# Patient Record
Sex: MALE | Race: WHITE | NOT HISPANIC OR LATINO | Employment: OTHER | URBAN - METROPOLITAN AREA
[De-identification: names, ages, dates, MRNs, and addresses within clinical notes are randomized per-mention and may not be internally consistent; named-entity substitution may affect disease eponyms.]

---

## 2017-02-16 ENCOUNTER — OFFICE VISIT - HEALTHEAST (OUTPATIENT)
Dept: INTERNAL MEDICINE | Facility: CLINIC | Age: 67
End: 2017-02-16

## 2017-02-16 DIAGNOSIS — R22.41 MASS OF RIGHT THIGH: ICD-10-CM

## 2017-02-16 DIAGNOSIS — R11.0 NAUSEA: ICD-10-CM

## 2017-02-27 ENCOUNTER — OFFICE VISIT - HEALTHEAST (OUTPATIENT)
Dept: SURGERY | Facility: CLINIC | Age: 67
End: 2017-02-27

## 2017-02-27 DIAGNOSIS — R19.00 PELVIC MASS IN MALE: ICD-10-CM

## 2017-02-27 ASSESSMENT — MIFFLIN-ST. JEOR: SCORE: 1836.73

## 2017-03-10 ENCOUNTER — HOSPITAL ENCOUNTER (OUTPATIENT)
Dept: MRI IMAGING | Facility: CLINIC | Age: 67
Discharge: HOME | End: 2017-03-10
Attending: SURGERY

## 2017-03-10 DIAGNOSIS — R19.00 PELVIC MASS IN MALE: ICD-10-CM

## 2017-03-20 ENCOUNTER — COMMUNICATION - HEALTHEAST (OUTPATIENT)
Dept: SURGERY | Facility: CLINIC | Age: 67
End: 2017-03-20

## 2017-03-29 ENCOUNTER — COMMUNICATION - HEALTHEAST (OUTPATIENT)
Dept: SURGERY | Facility: CLINIC | Age: 67
End: 2017-03-29

## 2017-04-04 ENCOUNTER — RECORDS - HEALTHEAST (OUTPATIENT)
Dept: GENERAL RADIOLOGY | Facility: CLINIC | Age: 67
End: 2017-04-04

## 2017-04-04 ENCOUNTER — COMMUNICATION - HEALTHEAST (OUTPATIENT)
Dept: INTERNAL MEDICINE | Facility: CLINIC | Age: 67
End: 2017-04-04

## 2017-04-04 ENCOUNTER — OFFICE VISIT - HEALTHEAST (OUTPATIENT)
Dept: INTERNAL MEDICINE | Facility: CLINIC | Age: 67
End: 2017-04-04

## 2017-04-04 DIAGNOSIS — M12.9 ARTHROPATHY, UNSPECIFIED: ICD-10-CM

## 2017-04-04 DIAGNOSIS — M19.072 ARTHROPATHY OF LEFT ANKLE: ICD-10-CM

## 2017-04-04 DIAGNOSIS — E53.8 OTHER B-COMPLEX DEFICIENCIES: ICD-10-CM

## 2017-04-04 DIAGNOSIS — Z12.5 SPECIAL SCREENING FOR MALIGNANT NEOPLASM OF PROSTATE: ICD-10-CM

## 2017-04-04 DIAGNOSIS — J45.20 MILD INTERMITTENT ASTHMA: ICD-10-CM

## 2017-04-04 DIAGNOSIS — D12.6 BENIGN NEOPLASM OF COLON: ICD-10-CM

## 2017-04-04 DIAGNOSIS — Z13.220 SCREENING CHOLESTEROL LEVEL: ICD-10-CM

## 2017-04-04 DIAGNOSIS — Z13.228 SCREENING FOR METABOLIC DISORDER: ICD-10-CM

## 2017-04-04 DIAGNOSIS — Z00.00 ROUTINE GENERAL MEDICAL EXAMINATION AT A HEALTH CARE FACILITY: ICD-10-CM

## 2017-04-04 LAB
CHOLEST SERPL-MCNC: 197 MG/DL
FASTING STATUS PATIENT QL REPORTED: NORMAL
HDLC SERPL-MCNC: 46 MG/DL
LDLC SERPL CALC-MCNC: 129 MG/DL
PSA SERPL-MCNC: 0.7 NG/ML (ref 0–4.5)
TRIGL SERPL-MCNC: 108 MG/DL

## 2017-04-04 ASSESSMENT — MIFFLIN-ST. JEOR: SCORE: 1830.61

## 2017-04-06 ENCOUNTER — COMMUNICATION - HEALTHEAST (OUTPATIENT)
Dept: INTERNAL MEDICINE | Facility: CLINIC | Age: 67
End: 2017-04-06

## 2017-04-11 ENCOUNTER — RECORDS - HEALTHEAST (OUTPATIENT)
Dept: ADMINISTRATIVE | Facility: OTHER | Age: 67
End: 2017-04-11

## 2017-07-14 ENCOUNTER — COMMUNICATION - HEALTHEAST (OUTPATIENT)
Dept: INTERNAL MEDICINE | Facility: CLINIC | Age: 67
End: 2017-07-14

## 2017-08-10 ENCOUNTER — RECORDS - HEALTHEAST (OUTPATIENT)
Dept: ADMINISTRATIVE | Facility: OTHER | Age: 67
End: 2017-08-10

## 2018-05-10 ENCOUNTER — COMMUNICATION - HEALTHEAST (OUTPATIENT)
Dept: INTERNAL MEDICINE | Facility: CLINIC | Age: 68
End: 2018-05-10

## 2018-05-14 ENCOUNTER — OFFICE VISIT - HEALTHEAST (OUTPATIENT)
Dept: INTERNAL MEDICINE | Facility: CLINIC | Age: 68
End: 2018-05-14

## 2018-05-14 DIAGNOSIS — F41.9 ANXIETY: ICD-10-CM

## 2018-05-14 DIAGNOSIS — S06.9XAA TBI (TRAUMATIC BRAIN INJURY) (H): ICD-10-CM

## 2018-05-22 ENCOUNTER — COMMUNICATION - HEALTHEAST (OUTPATIENT)
Dept: INTERNAL MEDICINE | Facility: CLINIC | Age: 68
End: 2018-05-22

## 2018-05-22 DIAGNOSIS — J45.20 MILD INTERMITTENT ASTHMA: ICD-10-CM

## 2018-11-07 ENCOUNTER — COMMUNICATION - HEALTHEAST (OUTPATIENT)
Dept: INTERNAL MEDICINE | Facility: CLINIC | Age: 68
End: 2018-11-07

## 2018-11-23 ENCOUNTER — OFFICE VISIT - HEALTHEAST (OUTPATIENT)
Dept: FAMILY MEDICINE | Facility: CLINIC | Age: 68
End: 2018-11-23

## 2018-11-23 DIAGNOSIS — N62 GYNECOMASTIA: ICD-10-CM

## 2018-11-23 DIAGNOSIS — L57.0 ACTINIC KERATOSIS: ICD-10-CM

## 2018-11-23 DIAGNOSIS — Z12.5 SCREENING FOR PROSTATE CANCER: ICD-10-CM

## 2018-11-23 DIAGNOSIS — Z00.00 WELL ADULT EXAM: ICD-10-CM

## 2018-11-23 DIAGNOSIS — D17.30 LIPOMA OF SKIN AND SUBCUTANEOUS TISSUE: ICD-10-CM

## 2018-11-23 DIAGNOSIS — F41.9 ANXIETY: ICD-10-CM

## 2018-11-23 DIAGNOSIS — H35.9 RETINA DISORDER: ICD-10-CM

## 2018-11-23 DIAGNOSIS — D48.5 NEOPLASM OF UNCERTAIN BEHAVIOR OF SKIN: ICD-10-CM

## 2018-11-23 DIAGNOSIS — E53.8 VITAMIN B12 DEFICIENCY (NON ANEMIC): ICD-10-CM

## 2018-11-23 DIAGNOSIS — S06.9X3A TRAUMATIC BRAIN INJURY, WITH LOSS OF CONSCIOUSNESS OF 1 HOUR TO 5 HOURS 59 MINUTES, INITIAL ENCOUNTER (H): ICD-10-CM

## 2018-11-27 ENCOUNTER — AMBULATORY - HEALTHEAST (OUTPATIENT)
Dept: LAB | Facility: CLINIC | Age: 68
End: 2018-11-27

## 2018-11-27 DIAGNOSIS — E53.8 VITAMIN B12 DEFICIENCY (NON ANEMIC): ICD-10-CM

## 2018-11-27 DIAGNOSIS — Z12.5 SCREENING FOR PROSTATE CANCER: ICD-10-CM

## 2018-11-27 DIAGNOSIS — F41.9 ANXIETY: ICD-10-CM

## 2018-11-27 DIAGNOSIS — N62 GYNECOMASTIA: ICD-10-CM

## 2018-11-27 DIAGNOSIS — Z00.00 WELL ADULT EXAM: ICD-10-CM

## 2018-11-27 LAB
ALBUMIN SERPL-MCNC: 3.9 G/DL (ref 3.5–5)
ALP SERPL-CCNC: 45 U/L (ref 45–120)
ALT SERPL W P-5'-P-CCNC: 22 U/L (ref 0–45)
ANION GAP SERPL CALCULATED.3IONS-SCNC: 11 MMOL/L (ref 5–18)
AST SERPL W P-5'-P-CCNC: 19 U/L (ref 0–40)
BASOPHILS # BLD AUTO: 0 THOU/UL (ref 0–0.2)
BASOPHILS NFR BLD AUTO: 1 % (ref 0–2)
BILIRUB SERPL-MCNC: 0.5 MG/DL (ref 0–1)
BUN SERPL-MCNC: 20 MG/DL (ref 8–22)
C REACTIVE PROTEIN LHE: 0.4 MG/DL (ref 0–0.8)
CALCIUM SERPL-MCNC: 9.3 MG/DL (ref 8.5–10.5)
CHLORIDE BLD-SCNC: 106 MMOL/L (ref 98–107)
CO2 SERPL-SCNC: 25 MMOL/L (ref 22–31)
CREAT SERPL-MCNC: 1.01 MG/DL (ref 0.7–1.3)
EOSINOPHIL # BLD AUTO: 0.2 THOU/UL (ref 0–0.4)
EOSINOPHIL NFR BLD AUTO: 4 % (ref 0–6)
ERYTHROCYTE [DISTWIDTH] IN BLOOD BY AUTOMATED COUNT: 12.7 % (ref 11–14.5)
ERYTHROCYTE [SEDIMENTATION RATE] IN BLOOD BY WESTERGREN METHOD: 2 MM/HR (ref 0–15)
GFR SERPL CREATININE-BSD FRML MDRD: >60 ML/MIN/1.73M2
GLUCOSE BLD-MCNC: 104 MG/DL (ref 70–125)
HCT VFR BLD AUTO: 48.1 % (ref 40–54)
HGB BLD-MCNC: 16 G/DL (ref 14–18)
LYMPHOCYTES # BLD AUTO: 1.2 THOU/UL (ref 0.8–4.4)
LYMPHOCYTES NFR BLD AUTO: 25 % (ref 20–40)
MCH RBC QN AUTO: 30.3 PG (ref 27–34)
MCHC RBC AUTO-ENTMCNC: 33.3 G/DL (ref 32–36)
MCV RBC AUTO: 91 FL (ref 80–100)
MONOCYTES # BLD AUTO: 0.3 THOU/UL (ref 0–0.9)
MONOCYTES NFR BLD AUTO: 6 % (ref 2–10)
NEUTROPHILS # BLD AUTO: 3.1 THOU/UL (ref 2–7.7)
NEUTROPHILS NFR BLD AUTO: 65 % (ref 50–70)
PLATELET # BLD AUTO: 202 THOU/UL (ref 140–440)
PMV BLD AUTO: 9 FL (ref 7–10)
POTASSIUM BLD-SCNC: 4.1 MMOL/L (ref 3.5–5)
PROLACTIN SERPL-MCNC: 6.4 NG/ML (ref 0–15)
PROT SERPL-MCNC: 6.6 G/DL (ref 6–8)
PSA SERPL-MCNC: 0.8 NG/ML (ref 0–4.5)
RBC # BLD AUTO: 5.29 MILL/UL (ref 4.4–6.2)
SODIUM SERPL-SCNC: 142 MMOL/L (ref 136–145)
THYROID PEROXIDASE ANTIBODIES - HISTORICAL: <3 IU/ML (ref 0–5.6)
TSH SERPL DL<=0.005 MIU/L-ACNC: 1.7 UIU/ML (ref 0.3–5)
VIT B12 SERPL-MCNC: 568 PG/ML (ref 213–816)
WBC: 4.8 THOU/UL (ref 4–11)

## 2018-11-28 LAB
25(OH)D3 SERPL-MCNC: 30.7 NG/ML (ref 30–80)
DHEA-S SERPL-MCNC: 130 UG/DL (ref 42–290)

## 2018-11-30 LAB
CHOLEST SERPL-MCNC: 213 MG/DL
HDL SERPL QN: 8.7 NM
HDL SERPL-SCNC: 37.9 UMOL/L
HDLC SERPL-MCNC: 59 MG/DL (ref 40–59)
HLD.LARGE SERPL-SCNC: 4.7 UMOL/L
LDL SERPL QN: 21 NM
LDL SERPL-SCNC: 1797 NMOL/L
LDL SMALL SERPL-SCNC: 696 NMOL/L
LDLC SERPL CALC-MCNC: 138 MG/DL
PATHOLOGY STUDY: ABNORMAL
SHBG SERPL-SCNC: 48 NMOL/L (ref 11–80)
TESTOST FREE SERPL-MCNC: 6.64 NG/DL (ref 4.7–24.4)
TESTOST SERPL-MCNC: 415 NG/DL (ref 240–950)
TRIGL SERPL-MCNC: 81 MG/DL (ref 30–149)
VLDL LARGE SERPL-SCNC: 2.2 NMOL/L
VLDL SERPL QN: 46 NM

## 2018-12-05 ENCOUNTER — COMMUNICATION - HEALTHEAST (OUTPATIENT)
Dept: FAMILY MEDICINE | Facility: CLINIC | Age: 68
End: 2018-12-05

## 2018-12-11 ENCOUNTER — COMMUNICATION - HEALTHEAST (OUTPATIENT)
Dept: FAMILY MEDICINE | Facility: CLINIC | Age: 68
End: 2018-12-11

## 2018-12-12 ENCOUNTER — COMMUNICATION - HEALTHEAST (OUTPATIENT)
Dept: FAMILY MEDICINE | Facility: CLINIC | Age: 68
End: 2018-12-12

## 2018-12-26 ENCOUNTER — RECORDS - HEALTHEAST (OUTPATIENT)
Dept: ADMINISTRATIVE | Facility: OTHER | Age: 68
End: 2018-12-26

## 2019-02-04 ENCOUNTER — COMMUNICATION - HEALTHEAST (OUTPATIENT)
Dept: SCHEDULING | Facility: CLINIC | Age: 69
End: 2019-02-04

## 2019-02-04 ENCOUNTER — OFFICE VISIT - HEALTHEAST (OUTPATIENT)
Dept: FAMILY MEDICINE | Facility: CLINIC | Age: 69
End: 2019-02-04

## 2019-02-04 DIAGNOSIS — J06.9 VIRAL URI WITH COUGH: ICD-10-CM

## 2019-02-06 ENCOUNTER — OFFICE VISIT - HEALTHEAST (OUTPATIENT)
Dept: SURGERY | Facility: CLINIC | Age: 69
End: 2019-02-06

## 2019-02-06 ENCOUNTER — COMMUNICATION - HEALTHEAST (OUTPATIENT)
Dept: SCHEDULING | Facility: CLINIC | Age: 69
End: 2019-02-06

## 2019-02-06 DIAGNOSIS — D17.30 LIPOMA OF SKIN AND SUBCUTANEOUS TISSUE: ICD-10-CM

## 2019-02-06 ASSESSMENT — MIFFLIN-ST. JEOR: SCORE: 1814.51

## 2019-02-28 ASSESSMENT — MIFFLIN-ST. JEOR: SCORE: 1814.05

## 2019-03-01 ENCOUNTER — OFFICE VISIT - HEALTHEAST (OUTPATIENT)
Dept: FAMILY MEDICINE | Facility: CLINIC | Age: 69
End: 2019-03-01

## 2019-03-01 DIAGNOSIS — J33.9 NASAL POLYPS: ICD-10-CM

## 2019-03-01 DIAGNOSIS — J45.20 MILD INTERMITTENT ASTHMA, UNSPECIFIED WHETHER COMPLICATED: ICD-10-CM

## 2019-03-01 DIAGNOSIS — D17.23 LIPOMA OF RIGHT LOWER EXTREMITY: ICD-10-CM

## 2019-03-01 LAB
ALBUMIN SERPL-MCNC: 3.9 G/DL (ref 3.5–5)
ALP SERPL-CCNC: 55 U/L (ref 45–120)
ALT SERPL W P-5'-P-CCNC: 26 U/L (ref 0–45)
ANION GAP SERPL CALCULATED.3IONS-SCNC: 10 MMOL/L (ref 5–18)
AST SERPL W P-5'-P-CCNC: 16 U/L (ref 0–40)
BILIRUB SERPL-MCNC: 0.4 MG/DL (ref 0–1)
BUN SERPL-MCNC: 19 MG/DL (ref 8–22)
CALCIUM SERPL-MCNC: 9.5 MG/DL (ref 8.5–10.5)
CHLORIDE BLD-SCNC: 106 MMOL/L (ref 98–107)
CO2 SERPL-SCNC: 26 MMOL/L (ref 22–31)
CREAT SERPL-MCNC: 0.87 MG/DL (ref 0.7–1.3)
GFR SERPL CREATININE-BSD FRML MDRD: >60 ML/MIN/1.73M2
GLUCOSE BLD-MCNC: 86 MG/DL (ref 70–125)
POTASSIUM BLD-SCNC: 4.5 MMOL/L (ref 3.5–5)
PROT SERPL-MCNC: 6.8 G/DL (ref 6–8)
SODIUM SERPL-SCNC: 142 MMOL/L (ref 136–145)

## 2019-03-06 ENCOUNTER — ANESTHESIA - HEALTHEAST (OUTPATIENT)
Dept: SURGERY | Facility: AMBULATORY SURGERY CENTER | Age: 69
End: 2019-03-06

## 2019-03-07 ENCOUNTER — SURGERY - HEALTHEAST (OUTPATIENT)
Dept: SURGERY | Facility: AMBULATORY SURGERY CENTER | Age: 69
End: 2019-03-07

## 2019-03-07 ASSESSMENT — MIFFLIN-ST. JEOR: SCORE: 1814.05

## 2019-03-13 LAB
ATRIAL RATE - MUSE: 66 BPM
DIASTOLIC BLOOD PRESSURE - MUSE: NORMAL MMHG
INTERPRETATION ECG - MUSE: NORMAL
P AXIS - MUSE: 77 DEGREES
PR INTERVAL - MUSE: 186 MS
QRS DURATION - MUSE: 88 MS
QT - MUSE: 398 MS
QTC - MUSE: 417 MS
R AXIS - MUSE: 30 DEGREES
SYSTOLIC BLOOD PRESSURE - MUSE: NORMAL MMHG
T AXIS - MUSE: 66 DEGREES
VENTRICULAR RATE- MUSE: 66 BPM

## 2019-03-14 ENCOUNTER — OFFICE VISIT - HEALTHEAST (OUTPATIENT)
Dept: SURGERY | Facility: CLINIC | Age: 69
End: 2019-03-14

## 2019-03-14 DIAGNOSIS — Z48.89 POSTOPERATIVE VISIT: ICD-10-CM

## 2019-03-19 ENCOUNTER — COMMUNICATION - HEALTHEAST (OUTPATIENT)
Dept: SURGERY | Facility: CLINIC | Age: 69
End: 2019-03-19

## 2019-06-20 ENCOUNTER — COMMUNICATION - HEALTHEAST (OUTPATIENT)
Dept: FAMILY MEDICINE | Facility: CLINIC | Age: 69
End: 2019-06-20

## 2019-06-20 DIAGNOSIS — J45.20 MILD INTERMITTENT ASTHMA: ICD-10-CM

## 2020-01-16 ENCOUNTER — OFFICE VISIT - HEALTHEAST (OUTPATIENT)
Dept: FAMILY MEDICINE | Facility: CLINIC | Age: 70
End: 2020-01-16

## 2020-01-16 DIAGNOSIS — Z71.84 TRAVEL ADVICE ENCOUNTER: ICD-10-CM

## 2020-01-16 DIAGNOSIS — J45.30 MILD PERSISTENT ASTHMA WITHOUT COMPLICATION: ICD-10-CM

## 2020-05-05 ENCOUNTER — COMMUNICATION - HEALTHEAST (OUTPATIENT)
Dept: FAMILY MEDICINE | Facility: CLINIC | Age: 70
End: 2020-05-05

## 2020-05-05 DIAGNOSIS — J45.30 MILD PERSISTENT ASTHMA WITHOUT COMPLICATION: ICD-10-CM

## 2020-05-06 RX ORDER — DEXAMETHASONE 4 MG/1
TABLET ORAL
Qty: 12 INHALER | Refills: 2 | Status: SHIPPED | OUTPATIENT
Start: 2020-05-06

## 2020-10-15 ENCOUNTER — RECORDS - HEALTHEAST (OUTPATIENT)
Dept: ADMINISTRATIVE | Facility: OTHER | Age: 70
End: 2020-10-15

## 2021-03-26 ENCOUNTER — OFFICE VISIT - HEALTHEAST (OUTPATIENT)
Dept: FAMILY MEDICINE | Facility: CLINIC | Age: 71
End: 2021-03-26

## 2021-03-26 DIAGNOSIS — Z20.822 EXPOSURE TO COVID-19 VIRUS: ICD-10-CM

## 2021-03-26 DIAGNOSIS — Z11.59 ENCOUNTER FOR HEPATITIS C SCREENING TEST FOR LOW RISK PATIENT: ICD-10-CM

## 2021-03-26 DIAGNOSIS — D48.5 NEOPLASM OF UNCERTAIN BEHAVIOR OF SKIN: ICD-10-CM

## 2021-03-26 DIAGNOSIS — H61.23 CERUMINOSIS, BILATERAL: ICD-10-CM

## 2021-05-18 ENCOUNTER — RECORDS - HEALTHEAST (OUTPATIENT)
Dept: ADMINISTRATIVE | Facility: OTHER | Age: 71
End: 2021-05-18

## 2021-05-22 ENCOUNTER — HEALTH MAINTENANCE LETTER (OUTPATIENT)
Age: 71
End: 2021-05-22

## 2021-05-28 ASSESSMENT — ASTHMA QUESTIONNAIRES: ACT_TOTALSCORE: 25

## 2021-05-29 ENCOUNTER — RECORDS - HEALTHEAST (OUTPATIENT)
Dept: ADMINISTRATIVE | Facility: CLINIC | Age: 71
End: 2021-05-29

## 2021-05-29 NOTE — TELEPHONE ENCOUNTER
Refill Request  Did you contact pharmacy: No  Medication name:   Requested Prescriptions     Pending Prescriptions Disp Refills     albuterol (PROAIR HFA) 90 mcg/actuation inhaler 8.5 Inhaler 5     Who prescribed the medication: BONITA WEBSTER  Pharmacy Name and Location: [facility] in [place] [address]  Is patient out of medication: Yes  Patient notified refills processed in 72 hours:  yes  Okay to leave a detailed message: yes    Please expedite as patient is going out of town.  Please have covering Provider view and approve.

## 2021-05-30 VITALS — BODY MASS INDEX: 24.97 KG/M2 | HEIGHT: 77 IN | WEIGHT: 211.5 LBS

## 2021-05-30 VITALS — HEIGHT: 76 IN | BODY MASS INDEX: 26.18 KG/M2 | WEIGHT: 215 LBS

## 2021-05-30 VITALS — WEIGHT: 216.8 LBS

## 2021-05-31 ENCOUNTER — RECORDS - HEALTHEAST (OUTPATIENT)
Dept: ADMINISTRATIVE | Facility: CLINIC | Age: 71
End: 2021-05-31

## 2021-06-02 VITALS — BODY MASS INDEX: 25.57 KG/M2 | HEIGHT: 76 IN | WEIGHT: 210 LBS

## 2021-06-02 VITALS — BODY MASS INDEX: 25.2 KG/M2 | WEIGHT: 207 LBS

## 2021-06-02 VITALS — WEIGHT: 210.1 LBS | HEIGHT: 76 IN | BODY MASS INDEX: 25.58 KG/M2

## 2021-06-02 VITALS — BODY MASS INDEX: 25.42 KG/M2 | WEIGHT: 208.8 LBS

## 2021-06-02 VITALS — BODY MASS INDEX: 25.56 KG/M2 | WEIGHT: 210 LBS

## 2021-06-04 VITALS
DIASTOLIC BLOOD PRESSURE: 70 MMHG | OXYGEN SATURATION: 99 % | SYSTOLIC BLOOD PRESSURE: 100 MMHG | HEART RATE: 58 BPM | WEIGHT: 201 LBS | BODY MASS INDEX: 24.47 KG/M2

## 2021-06-05 VITALS
SYSTOLIC BLOOD PRESSURE: 100 MMHG | TEMPERATURE: 97.7 F | OXYGEN SATURATION: 99 % | HEART RATE: 73 BPM | DIASTOLIC BLOOD PRESSURE: 56 MMHG | BODY MASS INDEX: 22.21 KG/M2 | WEIGHT: 182.44 LBS

## 2021-06-05 NOTE — PROGRESS NOTES
OFFICE VISIT - FAMILY MEDICINE     ASSESSMENT AND PLAN     1. Travel advice encounter     2. Mild persistent asthma without complication  albuterol (PROAIR HFA) 90 mcg/actuation inhaler    fluticasone propionate (FLOVENT HFA) 110 mcg/actuation inhaler   We did review CDC recommendation for traveling to [facility], patient's immunization was reviewed, he will get the typhoid, hepatitis A #2, and flu vaccine.  He will check insurance coverage on shingrix and possibly get that at his local  pharmacy.  Asthma, uncontrolled, using albuterol daily we discussed adding a controller he was in agreement, Flovent was sent to use as directed, return if not improving.  Use albuterol as a rescue, asthma action plan given to patient.    CHIEF COMPLAINT   Travel Consult (Leaving for [facility] on 1/20/20 for 1 week, needs vaccination for trip.)    HPI   Shahriaredwar Watters is a 69 y.o. male.  No Patient Care Coordination Note on file.    Traveling to [facility] for about a week, would like us immunization update, immunization was reviewed, he only got one hip in the past, need a second hep A, CDC website reviewed, also need typhoid vaccine, no need for malaria prophylaxis, we discussed about no vaccine preventable disease.  Also due for influenza vaccine, he will check insurance cost for the shingles.  He is up-to-date with pneumonia vaccine.  Mild intermittent asthma, but has been using his inhaler for shortness of breath pretty much daily for the past few weeks.    Review of Systems As per HPI, otherwise negative.    OBJECTIVE   /70 (Patient Site: Left Arm, Patient Position: Sitting, Cuff Size: Adult Regular)   Pulse (!) 58   Wt 201 lb (91.2 kg)   SpO2 99%   BMI 24.47 kg/m    Physical Exam   Constitutional: He is oriented to person, place, and time. He appears well-developed and well-nourished.   HENT:   Head: Normocephalic and atraumatic.   Cardiovascular: Normal rate, regular rhythm, normal heart sounds and intact distal pulses. Exam  reveals no gallop and no friction rub.   No murmur heard.  Pulmonary/Chest: Effort normal and breath sounds normal. No respiratory distress. He has no wheezes. He has no rales.   Musculoskeletal:         General: No tenderness or edema.   Neurological: He is alert and oriented to person, place, and time. Coordination normal.   Psychiatric: He has a normal mood and affect. Judgment and thought content normal.       PFSH     Family History   Problem Relation Age of Onset     Lung cancer Father      Colon cancer Brother 51        colon cancer      Dementia Mother 83        2014     Social History     Socioeconomic History     Marital status:      Spouse name: Not on file     Number of children: Not on file     Years of education: Not on file     Highest education level: Not on file   Occupational History     Not on file   Social Needs     Financial resource strain: Not on file     Food insecurity:     Worry: Not on file     Inability: Not on file     Transportation needs:     Medical: Not on file     Non-medical: Not on file   Tobacco Use     Smoking status: Former Smoker     Smokeless tobacco: Never Used   Substance and Sexual Activity     Alcohol use: Yes     Alcohol/week: 8.0 standard drinks     Types: 4 Glasses of wine, 4 Standard drinks or equivalent per week     Drug use: No     Sexual activity: Yes     Partners: Female   Lifestyle     Physical activity:     Days per week: Not on file     Minutes per session: Not on file     Stress: Not on file   Relationships     Social connections:     Talks on phone: Not on file     Gets together: Not on file     Attends [employer] service: Not on file     Active member of club or organization: Not on file     Attends meetings of clubs or organizations: Not on file     Relationship status: Not on file     Intimate partner violence:     Fear of current or ex partner: Not on file     Emotionally abused: Not on file     Physically abused: Not on file     Forced sexual  "activity: Not on file   Other Topics Concern     Not on file   Social History Narrative     Not on file     Relevant history was reviewed with the patient today, unless noted in HPI, nothing is pertinent for this visit.  [facility]     Patient Active Problem List    Diagnosis Date Noted     Lipoma of right thigh 02/06/2019     Overview Note:     Added automatically from request for surgery [id number]       TBI (traumatic brain injury) (H) 05/14/2018     Overview Note:     6/25/2017  Fell in garage 3 days ICU ICH   Since recovered \"relatively Well\"  NOw Anxiety          Mild intermittent asthma      Overview Note:     Created by Conversion         Benign Adenomatous Polyp Of The Large Intestine      Overview Note:     Created by Conversion         Ophthalmoplegic Migraine Headache      Overview Note:     Created by Conversion    Replacement Utility updated for latest IMO load       Vitamin B12 Deficiency      Overview Note:     Created by Conversion         Past Surgical History:   Procedure Laterality Date     FRACTURE SURGERY       LIPOMA RESECTION Right 3/7/2019    Procedure: EXCISION, LIPOMA,  Right Hip;  Surgeon: Jorge Cordero MD;  Location: [facility];  Service: General     NO PAST SURGERIES       TONSILLECTOMY AND ADENOIDECTOMY       WISDOM TOOTH EXTRACTION       WRIST FRACTURE SURGERY         RESULTS/CONSULTS (Lab/Rad)   No results found for this or any previous visit (from the past 168 hour(s)).  No results found.  MEDICATIONS     Current Outpatient Medications on File Prior to Visit   Medication Sig Dispense Refill     cholecalciferol, vitamin D3, 1,000 unit tablet Take 1,000 Units by mouth.       cyanocobalamin 1000 MCG tablet Take 1,000 mcg by mouth.       [DISCONTINUED] albuterol (PROAIR HFA) 90 mcg/actuation inhaler INHALE 1-2 PUFFS BY MOUTH EVERY 4-6 HOURS AS NEEDED 8.5 Inhaler 1     [DISCONTINUED] albuterol (PROVENTIL HFA) 90 mcg/actuation inhaler INHALE ONE OR TWO PUFFS BY MOUTH EVERY FOUR TO SIX " HOURS AS NEEDED (PLEASE REMIND PATIENT TO SCHEDULE MEDCHECK APPOINTMENT FOR FURTHER REFIL       [DISCONTINUED] HYDROcodone-acetaminophen 5-325 mg per tablet Take 1-2 tablets by mouth every 4 (four) hours as needed for pain. 15 tablet 0     No current facility-administered medications on file prior to visit.        HEALTH MAINTENANCE / SCREENING   No data recorded, No data recorded,No data recorded  Immunization History   Administered Date(s) Administered     DT (pediatric) 06/07/2004     Hep A, Adult IM (19yr & older) 05/31/2000, 01/16/2020     Hep B, Adult 05/31/2000, 06/28/2000     Influenza high dose,seasonal,PF, 65+ yrs 11/23/2018, 01/16/2020     Influenza,seasonal quad, PF, =/> 6months 10/30/2014     Influenza,seasonal, Inj IIV3 10/30/2008, 10/26/2010, 10/25/2011, 10/30/2012, 10/30/2013     Influenza,seasonal,quad inj =/> 6months 10/28/2015     Pneumo Conj 13-V (2010&after) 04/04/2017     Pneumo Polysac 23-V 05/14/2018     Td, Adult, Absorbed 05/31/2000, 06/07/2004     Tdap 09/15/2012, 08/11/2014, 06/26/2017     Typhoid, Inj, Inactive 01/16/2020     Health Maintenance   Topic     HEPATITIS C SCREENING      ZOSTER VACCINES (1 of 2)     MEDICARE ANNUAL WELLNESS VISIT      ASTHMA ACTION PLAN      FALL RISK ASSESSMENT      ADVANCE CARE PLANNING      LIPID      COLONOSCOPY      TD 18+ HE      PNEUMOCOCCAL IMMUNIZATION 65+ LOW/MEDIUM RISK      INFLUENZA VACCINE RULE BASED      ASTHMA CONTROL TEST        Edelmira Paul MD  Family Medicine, [facility]     This note was dictated using a voice recognition software.  Any grammatical or context distortion are unintentional and inherent to the software.

## 2021-06-05 NOTE — PATIENT INSTRUCTIONS - HE
PATIENT COUNSELLING  1- Eat and drink safely  Be diligent about food and water precautions:    Avoid cooked food served at room temperature.    Avoid raw food, including raw vegetables unless they can be washed thoroughly.    Drink only beverages from sealed bottles or cans.    Water is safe if it has been boiled or chemically treated.    Avoid ice unless made from bottled/disinfected water.  2- Prevent bug bites  Be diligent in insect precautions:    Cover exposed skin.    Use an appropriate insect repellent. (see below)    Use permethrin-treated clothing and gear (such as boots, pants, socks, and tents). Travelers can buy pre-treated clothing and gear or treat them at home. Treated clothing remains protective after multiple washings. Permethrin should NOT be used directly on skin.    Stay and sleep under in air-conditioned or screened rooms.    Use a bed net if sleeping area is exposed to the outdoors.  3- Stay safe outdoors  Exercise caution during outdoor activities. Important tips include dressing appropriately for the climate (such as loose, lightweight clothing in hot climates and warm layers in cold climates), staying hydrated, avoiding overexposure to the sun, and practicing safe swimming habits. To avoid infection while swimming, travelers should not swallow water when swimming and avoid contact with water that may be contaminated from poor sanitation.  Encourage travelers to learn basic first aid and CPR before travel, especially if they will be traveling to remote areas where medical assistance may not be accessible. Help them assemble a travel health kit.  4- Keep away from animals  be cautious around all animals.    Travelers should avoid touching, petting, handling, or feeding animals, including pets.    Arthropods such as spiders and scorpions can pose a stinging risk, and travelers should exercise care in environments where these creatures  are likely to be present.    Stress the urgency of treating suspected and probable rabies infection by:    Washing the wound immediately with soap and clean water.    Seeking medical attention as soon as possible.    Travelers at risk for rabies should consider medical evacuation insurance, since postexposure prophylaxis may not be available at the destination.  5- Reduce your exposure to germs  People who are ill should not travel. Urge travelers to practice hand hygiene and sneeze into a tissue or their sleeve   6- Avoid sharing body fluids  Travelers should:    Use a latex condom correctly every time they engage in sex (vaginal, anal, and oral-genital).    Not inject drugs.    Limit alcohol consumption.    Not have tattoos, piercings, or other procedures that use needles (acupuncture) unless the needles are packaged new or sterilized.    Ensure that medical and dental equipment is sterile or disinfected if seeking care.    7- Know how and where to get medical care while traveling  Travelers should plan for how to obtain health care during their trip, should the need arise.  Discuss supplemental travel health insurance and medical evacuation insurance, and consider helping the traveler obtain an extra month of prescriptions for any needed medications.  Travelers may think they can find cheaper antimalarial drugs at their destination. To ensure medication quality, urge them to have their prescriptions filled in the [place].  8- Select safe transportation  9- Motor vehicle crashes are the #1 killer of healthy [place] citizens in foreign countries.  Most recommendations for safe transportation are basic and could be considered common sense. However, travelers often do not think about the importance of being aware and careful when walking, riding, driving, or flying.  In many places cars, buses, large trucks, rickshaws, bikes, pedestrians, and even animals share the same lanes of traffic, increasing the risk for  crashes.   travelers to think about transportation options before they arrive,   10- Medical Evacuation Insurance  If your patient is seriously injured, emergency care may not be available or may not meet [place] standards. Trauma care centers are uncommon outside urban areas. Encourage patients to purchase medical evacuation insurance.  Some basic reminders to review with your patients:    Choose safe vehicles and avoid motorbikes when possible.    Wear a seatbelt or a helmet at all times.    Do not drive after drinking alcohol or ride with someone who has been drinking.    Avoid driving at night; street lighting in certain parts of [facility] may be poor.    If they will be driving, remind them to get any driving permits and insurance they may need. It is recommended to get an International Driving Permit (IDP).    Avoid using local, unscheduled aircraft, and fly on larger planes (more than 30 seats) when possible    11- Maintain personal security  Travelers should be reminded on how to protect their personal safety during travel, regardless of their destination.   The [place] Department of State has an extensive website with safety information for international travelers, travel alerts and warnings, and country-specific information. Travelers should be directed to the Department of State resources for information and tips on safe travel.  Stay abreast of current events, particularly those that could pose a safety or health problem for travelers. You can also receive updates on new travel alerts and warnings from the [place] Department of State by subscribing to their [employer] feeds.    If you are bitten by an animal, vigorously scrub the wound for 10 minutes with soap and water. Seek medical attention within 24 hours. If you have had the rabies vaccine series you will need booster shots (usually widely available). If you are unimmunized you will need the vaccine AND Rabies Immune Globulin (RIG). RIG has limited availability in  the developing world, and urgent evacuation to a center is usually needed to obtain it. The sooner the vaccine and RIG are administered the more effective. Rabies is 100% fatal once symptoms set in, and even minor exposures demand full prevention measures.    It is recommended that you bring the following items along with you during your travels:  Medical supplies: Purell Hand    Sun screen: SPF 30 or above  Mosquito net: Permethrin-treated bed net, if needed  Mosquito repellants: Ultrathon Lotion 34% DEET  Nick Permethrin Soak Treatment Kit   Nick Permethrin Pump Spray   Nick Controlled Release Lotion (DEET)      Make copies of passport: 1 for traveling , 1 for friends or family residing in the [place].     Helpful Web Sites:  1. Centers for Disease Control and Prevention - Travelers' Health: http://wwwnc.cdc.gov/travel  2. [place] Department of State - Travel: http://www.state.gov/travel/  3. [place] Department of State - Smart Traveler Enrollment Program (STEP): https://step.state.gov/step/  For more information,visit CDC.GOV    Place travel patient instructions here.

## 2021-06-08 NOTE — PROGRESS NOTES
"[facility] Clinic Note  Patient Name: Shahriar Watters  Patient Age: 66 y.o.  YOB: 1950  MRN: [id number]  ?  Date of Visit: 2/16/2017  Reason for Office Visit:   Chief Complaint   Patient presents with     Nausea     started this morning around 3 am       HPI: Shahriar Watters 66 y.o. male who presents to clinic for nausea and diarrhea this morning He ate a late dinner, left over, he went to bed and woke around 3 am with nausea. No vomiting. Had some diarrhea this morning, no blood. Diffuse achy bloated feeling. No fevers, \"I may have a sensitivity to goat cheese\"    He also has a large mass on his right anterior thigh. Has been present for years, started out about size of a golf ball. Was evaluated around 2014 with an ultrasound, there is no report and he is not clear with results. Dr Teresa recommended a surgeon but never saw one. The mass has grown to the size of a softball.       Review of Systems: As noted in HPI     Current Scheduled Meds:  Outpatient Encounter Prescriptions as of 2/16/2017   Medication Sig Dispense Refill     albuterol (VENTOLIN HFA) 90 mcg/actuation inhaler INHALE ONE OR TWO PUFFS BY MOUTH EVERY FOUR TO SIX HOURS AS NEEDED (PLEASE REMIND PATIENT TO SCHEDULE MEDCHECK APPOINTMENT FOR FURTHER REFIL 18 g 4     [DISCONTINUED] VENTOLIN HFA 90 mcg/actuation inhaler INHALE ONE OR TWO PUFFS BY MOUTH EVERY FOUR TO SIX HOURS AS NEEDED (PLEASE REMIND PATIENT TO SCHEDULE MEDCHECK APPOINTMENT FOR FURTHER REFIL 18 g 4     No facility-administered encounter medications on file as of 2/16/2017.      No past medical history on file.  No past surgical history on file.  Social History   Substance Use Topics     Smoking status: Never Smoker     Smokeless tobacco: None     Alcohol use None       Objective / Physical Examination:  Visit Vitals     /62     Pulse 64     Temp 98.7  F (37.1  C) (Oral)     Wt 216 lb 12.8 oz (98.3 kg)     Wt Readings from Last 3 Encounters:   02/16/17 216 lb " 12.8 oz (98.3 kg)     There is no height or weight on file to calculate BMI. (>25?)    General Appearance: Alert and oriented in no acute distress  Cardiovascular: RRR S1, S2  Abdomen: Bowel sounds active all four quadrants. Soft, non-tender.   Extremities: anterior medial thigh subq mass about size of a softball, non tender, no erythema  Neuro: Alert and oriented, follows commands appropriately.    Assessment / Plan / Medical Decision Making:      Encounter Diagnoses   Name Primary?     Nausea Yes     Mass of right thigh         1. Nausea  Food borne illness likely. Exam normal. Vitals stable. He declined medication to treat, will wait it out    2. Mass of right thigh  Unclear etiology? Indirect inguinal hernia v lipoma v malignancy? He declined another ultrasound and would like to speak to a surgeon - referral made  - Ambulatory referral to General Surgery    Follow up with Dr Teresa for physical exam     Total time spent with patient was 15 minutes with >50% of time spent in face-to-face counseling regarding the above plan     Antwon Jackson MD  [facility]

## 2021-06-08 NOTE — TELEPHONE ENCOUNTER
RN cannot approve Refill Request    RN can NOT refill this medication med is not covered by policy/route to provider     . Last office visit: 1/16/2020 Edelmira Paul MD Last Physical: Visit date not found Last MTM visit: Visit date not found Last visit same specialty: 1/16/2020 Edelmira Paul MD.  Next visit within 3 mo: Visit date not found  Next physical within 3 mo: Visit date not found      Connie Mack, [facility] Triage/Med Refill 5/6/2020    Requested Prescriptions   Pending Prescriptions Disp Refills     FLOVENT  mcg/actuation inhaler [Pharmacy Med Name: FLOVENT  MCG INHALER] 12 Inhaler 2     Sig: TAKE 2 PUFFS BY MOUTH TWICE A DAY       There is no refill protocol information for this order

## 2021-06-09 NOTE — PROGRESS NOTES
ASSESSMENT:  1. Routine general medical examination at a health care facility  Appears to be up-to-date    2. Benign neoplasm of colon  Up-to-date colonoscopy  - Comprehensive Metabolic Panel    3. Mild intermittent asthma  Well-controlled with just as needed at albuterol.  We did discuss with her not he would need steroids he is not think he would.    4. Vitamin B12 Deficiency  There is some history of this but will check labs he does not take any supplements right now.  - HM2(CBC w/o Differential)  - Vitamin B12    5. Screening cholesterol level  This been a while since we have checked cholesterol we will check again today.  - Lipid Lewis    6. Screening for metabolic disorder  Look for any disorders  - Urinalysis    7. Special screening for malignant neoplasm of prostate    - PSA (Prostatic-Specific Antigen), Annual Screen    8. Arthropathy of left ankle  This is interesting.  He had trauma and I was suspicious that there could be a fracture with the amount of induration.  However there was warmth and significant erythema with blanching that was rather dramatic and makes me wonder about gout versus pseudogout.  X-rays failed to show any fracture although we will see what the radiologist read.  Certainly nothing is displaced.  At this point to treat him with indomethacin 50 mg p.o. 3 times daily for 3-5 days and see whether or not he has resolution.  Will check uric acid level and other labs today.  I did not think that he needed the joint tapped.    - XR Ankle Left 3 or More VWS; Future  - XR Tibia and Fibula Left; Future  - Uric Acid    The large lipoma he has on the right thigh is artery been seen by surgeon and if it is bothering him he certainly could have this resected.        PLAN:  Patient Instructions   My recommendation is you have the shingles vaccination completed somewhere.    Will need the Pneumonia 23 vaccine in about a year.     Vitamin D3 - 2000 IU daily    Due for colonoscopy-  #[phone]    Take indomethacin, 50 mg three times daily for 3-5 days, then as needed.     Use a heating pad for 15-20 minutes on the ankle.     Elevate the ankle and rest.     Get back to Dr. Teresa about the ankle in about 2 weeks (around April 17-18th).       Orders Placed This Encounter   Procedures     XR Ankle Left 3 or More VWS     Standing Status:   Future     Number of Occurrences:   1     Standing Expiration Date:   4/4/2018     Order Specific Question:   Can the procedure be changed per Radiologist protocol?     Answer:   Yes     XR Tibia and Fibula Left     Standing Status:   Future     Number of Occurrences:   1     Standing Expiration Date:   4/4/2018     Order Specific Question:   Can the procedure be changed per Radiologist protocol?     Answer:   Yes     Pneumococcal conjugate vaccine 13-valent 6wks-17yrs; >50yrs     Comprehensive Metabolic Panel     HM2(CBC w/o Differential)     Urinalysis     PSA (Prostatic-Specific Antigen), Annual Screen     Vitamin B12     Lipid Cascade     Order Specific Question:   Fasting is required?     Answer:   No     Uric Acid     Medications Discontinued During This Encounter   Medication Reason     albuterol (VENTOLIN HFA) 90 mcg/actuation inhaler Reorder       Return in about 1 year (around 4/4/2018) for Annual physical.    ASSESSED PROBLEMS:  Problem List Items Addressed This Visit     Mild intermittent asthma    Relevant Medications    albuterol (VENTOLIN HFA) 90 mcg/actuation inhaler    Benign Adenomatous Polyp Of The Large Intestine    Relevant Orders    Comprehensive Metabolic Panel    Vitamin B12 Deficiency    Relevant Orders    HM2(CBC w/o Differential) (Completed)    Vitamin B12      Other Visit Diagnoses     Routine general medical examination at a health care facility    -  Primary    Screening cholesterol level        Relevant Orders    Lipid Cascade    Screening for metabolic disorder        Relevant Orders    Urinalysis (Completed)    Special  screening for malignant neoplasm of prostate        Relevant Orders    PSA (Prostatic-Specific Antigen), Annual Screen    Arthropathy of left ankle        Relevant Orders    XR Ankle Left 3 or More VWS    XR Tibia and Fibula Left    Uric Acid          CHIEF COMPLAINT:  Chief Complaint   Patient presents with     Annual Exam     left ankle twisted     lipoma on his right upper leg     MRI done in Feb       HISTORY OF PRESENT ILLNESS:  Shahriar Watters is a 66 y.o. male presenting to the clinic today for an annual physical exam.    Ankle Arthropathy: He had a left ankle injury around 1-2 weeks ago and has noticed swelling in the ankle since. He thought the swelling was going down at some point, but it started increasing again. The only treatment he did for his ankle was applying ice and wrapping the ankle. The pain is located more so in the area above his ankle, not necessarily over the ankle itself. He has noticed a little swelling in his left calf as well.     Health maintenance: He had a colonoscopy completed in 7/19/11 and a hyperplastic polyp was removed; he is on a 5-year screening plan. He received the PCV 13 vaccination today and is due for the Zoster vaccination, otherwise all of his immunizations are up to date at this time. He has an eye exam completed on a regular basis.     REVIEW OF SYSTEMS:   He states that there are some weeks where he needs to use his albuterol inhaler every day and at other times he will not need to use the inhaler for weeks. He cross country skis about 20k per week in the winter months. He needs to use his inhaler prior to exercising.   See completed form B. Comprehensive review of systems negative except as noted above.    PFSH:  Past Medical History:   Diagnosis Date     Lipoma      Past Surgical History:   Procedure Laterality Date     NO PAST SURGERIES       Family History   Problem Relation Age of Onset     Lung cancer Father      Colon cancer Brother 51     Social History  "    Social History     Marital status:      Spouse name: N/A     Number of children: N/A     Years of education: N/A     Occupational History     Not on file.     Social History Main Topics     Smoking status: Former Smoker     Smokeless tobacco: Not on file     Alcohol use 2.4 oz/week     4 Standard drinks or equivalent per week     Drug use: No     Sexual activity: Yes     Partners: Female     Other Topics Concern     Not on file     Social History Narrative       VITALS:  Vitals:    04/04/17 0946   BP: 104/62   Pulse: 75   Weight: 211 lb 8 oz (95.9 kg)   Height: 6' 4.61\" (1.946 m)     Wt Readings from Last 3 Encounters:   04/04/17 211 lb 8 oz (95.9 kg)   02/27/17 215 lb (97.5 kg)   02/16/17 216 lb 12.8 oz (98.3 kg)     Body mass index is 25.33 kg/(m^2).    PHYSICAL EXAM:  General Appearance: Alert, cooperative, no distress, appears stated age.  HEENT: EMOI, fundi not observed, TMs normal, mouth and throat without lesions.  Neck: Supple without adenopathy or thyromegaly.  Back: No CVA tenderness or spinous process pain.  Lungs: Clear to auscultation bilaterally, good air movement.  Heart: Regular rate and rhythm, S1 and S2 normal, no murmur or bruit.  Abdomen: Soft, non-tender, no HSM or masses.  Genitourinary: Normal male, testes descended without masses or hernias.  Rectal exam:  Normal size for age without nodules. Relatively flat.   Musculoskeletal: Left Ankle: He has some induration and edema in the lower third of the left lower extremity.  There is some mild tenderness up to the fibula.  There is no significant tenderness just above the lateral malleolus and into the malleolus.  There is blanching significant erythema and warmth on the lateral malleolus.  Motion of the ankle joint itself does not seem too bad.  No tenderness in the calcaneus or distal foot or over the deltoid ligament as an example.  No tenderness in the calf and no cords.  Extremities: No CCE, pulses II/IV and symmetric.   Skin: " Baseball sized lipoma right upper thigh.  No other worrisome lesions noted.  Lymph nodes: Cervical, supraclavicular, groin, and axillary nodes normal.  Neurologic: CNII-XII intact, strength V/V and symmetric, DTRs II/IV and symmetric, sensory grossly intact  Psychiatric:  He has a normal mood and affect.     ADDITIONAL HISTORY SUMMARIZED (FROM OLD RECORDS OR HISTORY FROM SOMEONE OTHER THAN THE PATIENT OR ANOTHER HEALTHCARE PROVIDER) (2 TOTAL): Reviewed physical from 8/4/14; lipoma, hyperlipidemia, vit B12 deficiency.    DECISION TO OBTAIN EXTRA INFORMATION (OLD RECORDS REQUESTED OR HISTORY FROM ANOTHER PERSON OR ACCESSING CARE EVERYWHERE) (1 TOTAL): None.     RADIOLOGY TESTS SUMMARIZED OR ORDERED (XRAY/CT/MRI/DXA) (1 TOTAL): Ordered ankle and tibia/fibula Xrays today.    LABS REVIEWED OR ORDERED (1 TOTAL): Ordered CMP, HM2, vit B12, lipid, UA, and PSA labs today.    MEDICINE TESTS SUMMARIZED OR ORDERED (EKG/ECHO/COLONOSCOPY/EGD) (1 TOTAL): None.    INDEPENDENT REVIEW OF EKG OR X-RAY (2 EACH): Independent review of ankle XR as ordered above; no fracture. Independent review of tibia fibula XR as ordered above; no fracture.       The visit lasted a total of 16 minutes face to face with the patient. Over 50% of the time was spent counseling and educating the patient about health maintenance.    IHarriet, am scribing for and in the presence of, Dr. Teresa.    I, Dr. Teresa, personally performed the services described in this documentation, as scribed by Harriet Shepherd in my presence, and it is both accurate and complete.    MEDICATIONS:  Current Outpatient Prescriptions   Medication Sig Dispense Refill     albuterol (VENTOLIN HFA) 90 mcg/actuation inhaler INHALE ONE OR TWO PUFFS BY MOUTH EVERY FOUR TO SIX HOURS AS NEEDED (PLEASE REMIND PATIENT TO SCHEDULE [facility] APPOINTMENT FOR FURTHER REFIL 18 g 4     No current facility-administered medications for this visit.        Total data points: 8

## 2021-06-09 NOTE — PROGRESS NOTES
"Surgical consultation from Dr. Freddie Teresa to evaluate a mass on the pelvis.    HPI: Pt is here with concerns about a subcutaneous mass located right hip..  It has been present for at least 4 years.  Skin ultrasound done back in 2013 where it was noted to be a 10 cm mass at that time.  He tells me has continued to grow since then.   This lesion is not tender.  The lesion has not drained.    Allergies:Review of patient's allergies indicates no known allergies.    No past medical history on file.    No past surgical history on file.    REVIEW OF SYSTEMS:  10 point ROS is negative except for; as mentioned above.    CURRENT MEDS:    Current Outpatient Prescriptions:      albuterol (VENTOLIN HFA) 90 mcg/actuation inhaler, INHALE ONE OR TWO PUFFS BY MOUTH EVERY FOUR TO SIX HOURS AS NEEDED (PLEASE REMIND PATIENT TO SCHEDULE [facility] APPOINTMENT FOR FURTHER REFIL, Disp: 18 g, Rfl: 4    Visit Vitals     /60 (Patient Site: Right Arm, Patient Position: Sitting, Cuff Size: Adult Large)     Pulse 83     Resp 18     Ht 6' 4\" (1.93 m)     Wt 215 lb (97.5 kg)     SpO2 100%     BMI 26.17 kg/m2     Body mass index is 26.17 kg/(m^2).    EXAM:  GENERAL:Well developed he appears his stated age  HEAD & NECK: Extraocular motions intact, anicteric sclera,  ABDOMEN: Soft and nondistended, positive bowel sounds  LUNGS:  CTA  HEART:  RRR  EXTREMITIES: Full mobility,   INTEGUMENT: The patient has a subcutaneous lesion located on his right hip as if he is putting his hand and right pants pocket..   The lesion is 12 cm x 8 cm cm wide.    Assessment/Plan: The pt has a 12 cm subcutaneous lesion located on the right hip lateral to the superior and inferior anterior iliac spines..    This lesion is likely either a Lipoma or potentially a liposarcoma.. These lesion is growing.  With these features I recommend we start with an MRI to evaluate the extent of this lesion.  If it has features concerning for sarcoma a plan to do a needle core " biopsy.  If it does not look to be in obvious sarcoma, I will schedule this for an excisional biopsy.     MRI of pelvis..    Jorge Cordero MD  [phone]  [facility] Department of Surgery

## 2021-06-16 PROBLEM — S06.9XAA TBI (TRAUMATIC BRAIN INJURY) (H): Status: ACTIVE | Noted: 2018-05-14

## 2021-06-16 PROBLEM — D17.23 LIPOMA OF RIGHT THIGH: Status: ACTIVE | Noted: 2019-02-06

## 2021-06-16 NOTE — PATIENT INSTRUCTIONS - HE
It was nice to see you today Shahriar    Glad to see that you got your COVID-19 vaccine  You could come in 4 weeks after your second shot to check antibodies I will put a lab referral in    Consider getting a shingles shot called Zostrix      Your right ear has wax  This may be some eustachian tube dysfunction  You could try over-the-counter Flonase 2 puffs daily in each nostril until finished  See if this is helpful    Great to hear about your asthma doing better  Your asthma control test was excellent    I would like you to see dermatology  You have some skin lesions on your face as well is left mid back, the picture I took for you that I would like to have dermatology assess

## 2021-06-16 NOTE — PROGRESS NOTES
ASSESSMENT & PLAN    No problem-specific Assessment & Plan notes found for this encounter.      Shahriar was seen today for ear infection.    Diagnoses and all orders for this visit:    Ceruminosis, bilateral  -     Ear cerumen removal; Future    Neoplasm of uncertain behavior of skin  -     Ambulatory referral to Dermatology    Exposure to COVID-19 virus  -     COVID-19 Virus (Coronavirus) Antibody & Titer Reflex; Future    Encounter for hepatitis C screening test for low risk patient  -     Cancel: Hepatitis C Antibody (Anti-HCV); Future        Patient Instructions   It was nice to see you today Shahriar    Glad to see that you got your COVID-19 vaccine  You could come in 4 weeks after your second shot to check antibodies I will put a lab referral in    Consider getting a shingles shot called Zostrix      Your right ear has wax  This may be some eustachian tube dysfunction  You could try over-the-counter Flonase 2 puffs daily in each nostril until finished  See if this is helpful    Great to hear about your asthma doing better  Your asthma control test was excellent    I would like you to see dermatology  You have some skin lesions on your face as well is left mid back, the picture I took for you that I would like to have dermatology assess          Return in about 6 months (around 9/26/2021).            CHIEF COMPLAINT: Shahriar Watters had concerns including Ear infection (right ear ).    Qagan Tayagungin: 1.............. SUBJECTIVE:  Shahriar Watters is a 70 y.o. male had concerns including Ear infection (right ear ).    1. Ceruminosis, bilateral    2. Neoplasm of uncertain behavior of skin    3. Exposure to COVID-19 virus    4. Encounter for hepatitis C screening test for low risk patient      Problem with his right ear ongoing for a month occasional fullness feels like his eustachian tube dysfunction mild discomfort he feels it is almost better  He does swim  He has mild tinnitus    No Known Allergies                      SOCIAL: He   reports that he has quit smoking. He has never used smokeless tobacco. He reports current alcohol use of about 8.0 standard drinks of alcohol per week. He reports that he does not use drugs.    REVIEW OF SYSTEMS:   Family history not pertinent to chief complaint or presenting problem    Review of systems otherwise negative as requested from patient, except   Those positive ROS outlined and discussed in [place].      VITALS:  Vitals:    03/26/21 0835   BP: 100/56   Patient Site: Left Arm   Patient Position: Sitting   Cuff Size: Adult Regular   Pulse: 73   Temp: 97.7  F (36.5  C)   TempSrc: Oral   SpO2: 99%   Weight: 182 lb 7 oz (82.8 kg)     Wt Readings from Last 3 Encounters:   03/26/21 182 lb 7 oz (82.8 kg)   01/16/20 201 lb (91.2 kg)   03/07/19 210 lb (95.3 kg)     Body mass index is 22.21 kg/m .    Physical Exam:  Bilateral mild cataracts  TMs right occluded with wax left partially occluded  Oropharynx is clear  Several areas of red irritated skin right side of his face measuring 1 cm for discrete areas  Left mid back 5 mm by pigmented nevus versus seborrheic keratosis with mixed pigment  Lungs clear  Cardiac no murmur  No carotid bruits  Excellent distal pulses  No lower extremity edema    Weight down 210 >>182    Intentional per patient     No results found for this or any previous visit (from the past 240 hour(s)).       I spent 30 minutes with this patient.  This includes pre-visit, intra-visit and post visit work an evaluation with regards to Shahriar was seen today for ear infection.    Diagnoses and all orders for this visit:    Ceruminosis, bilateral  -     Ear cerumen removal; Future    Neoplasm of uncertain behavior of skin  -     Ambulatory referral to Dermatology    Exposure to COVID-19 virus  -     COVID-19 Virus (Coronavirus) Antibody & Titer Reflex; Future    Encounter for hepatitis C screening test for low risk patient  -     Cancel: Hepatitis C Antibody (Anti-HCV); Future        Nestor Cameron  MD  Family Medicine   [place] [address]  [phone]

## 2021-06-18 NOTE — PROGRESS NOTES
ASSESSMENT/PLAN:  1. Anxiety  Question is whether or not he is anxious about whether or not he has some deficiency in his mental functioning or if he does have some deficiency.  He discussed this is being an anxiety type symptom at first but then raise some other questions.  We talked about multiple meds including Lexapro and alprazolam and he is going to little research and think about what he might want to take or not.  I also strongly encouraged counseling gave him a couple names or going back to the counselor at the traumatic brain injury facility.  If he decides that he wishes for a different medication then he will need to come back in and discuss this in detail.  What I discussed with him is that if this is a brain injury and there is deficiency in his mental functioning then he really should see the traumatic brain injury facility and do neuropsychometric testing and if there is deficiency discussed with them what treatment benefit therapy might be helpful.  If his brain function is normal and it is simply anxiety related to something then of course medications and counseling can help.    2. TBI (traumatic brain injury)  Please see discussion above.  I did recommend that he go back to the traumatic brain injury clinic    This was a complex decision making process and the way that he presented himself and the extent to which he wished to explain and how he explained the different symptoms.  There was some sleep component difficulty as well.  Please see the recommendations and instructions below    Patient Instructions   Please call your insurance company and discuss Shingrix vaccine. This is a series of 2 injections that MUST be given 2-6 months apart and will cost around $287.00 here at [facility].    Please note that if over the counter medications were taken off of your medication list, it is not because you are being asked to stop taking them.  does not want all of the over the counter  medications on your medication list, as it bogs down the list.     Recommendations as given on sleep hygiene handout.     You can continue taking 25-50 mg of Benadryl 30-60 minutes prior to bedtime as needed. You might need to take a little less than 25 mg depending on how groggy you are in the morning.     Counselors:   Karla Harrington- #[phone]  Salena Schafer- #[phone]    Recommend going back to trauma clinic to have possible neuropsych testing.       Return if symptoms worsen or fail to improve.    CHIEF COMPLAINT:  Chief Complaint   Patient presents with     concerns of a hx of brain injury       HISTORY OF PRESENT ILLNESS:  Shahriar Watters is a 67 y.o. male presenting to the clinic today for concerns of a previous TBI in June 2017. He sustained a skull fracture and cervical spine fracture at that time. He was in intensive care for a couple of days and was recuperating at home for a couple of months before starting back part time at work as an . A couple of months ago he began to develop intense anxiety regarding his previous injury. The anxiety has been disrupting his sleep and causing irregular sleep patterns. He will fall asleep for a couple of hours before waking and remaining awake for a couple of hours. Sometimes he is able to fall back asleep, other times he is unable to fall asleep. In addition, he has had nightmares regarding the TBI. He has been taking over the counter Benadryl, 25 mg, on occasion to try and aid with sleep. He has thought about seeing a therapist but has not done so yet. He notes that he feels as if he is not remembering things and is not able to do things as he was able to previously.     Health Maintenance: He received the PPSV 23 vaccine today.       REVIEW OF SYSTEMS:   His vision has recently deteriorated and notes that there is some sort of retinal defect. He has been told this through his ophthalmologist and was also seen by a retinal specialist. Comprehensive  "review of systems negative except as noted above.    PFSH:  Social: He works as an .     TOBACCO USE:  History   Smoking Status     Former Smoker   Smokeless Tobacco     Never Used       VITALS:  Vitals:    05/14/18 1613   BP: 110/62   Pulse: 72     Wt Readings from Last 3 Encounters:   04/04/17 211 lb 8 oz (95.9 kg)   02/27/17 215 lb (97.5 kg)   02/16/17 216 lb 12.8 oz (98.3 kg)     Estimated body mass index is 25.33 kg/(m^2) as calculated from the following:    Height as of 4/4/17: 6' 4.61\" (1.946 m).    Weight as of 4/4/17: 211 lb 8 oz (95.9 kg).    PHYSICAL EXAM:  General Appearance: Alert, cooperative, no distress, appears stated age.  Psychiatric:  He has a normal mood and affect.     Notes Reviewed, additional history from source other than patient (2 TOTAL): Reviewed ED note from 6/25/17; fall, sustained skull fracture.     Accessed Care Everywhere, Requested Records, Consult with Physician (1 TOTAL): None.     Radiology tests summarized or ordered (XR, CT, MRI, DXA, US) (1 TOTAL): None.    Labs reviewed or ordered (1 TOTAL): None.     Medicine tests reviewed or ordered (ECG, echocardiogram, colonoscopy, EGD, venous US) (1 TOTAL): None.    Independent review of ECG or XR (2 EACH): None.      The visit lasted a total of 18 minutes face to face with the patient. Over 50% of the time was spent counseling and educating the patient about anxiety.    IHarriet, am scribing for and in the presence of, Dr. Teresa.    IDr. Teresa, personally performed the services described in this documentation, as scribed by Harriet Shepherd in my presence, and it is both accurate and complete.    MEDICATIONS:  Current Outpatient Prescriptions   Medication Sig Dispense Refill     albuterol (VENTOLIN HFA) 90 mcg/actuation inhaler INHALE ONE OR TWO PUFFS BY MOUTH EVERY FOUR TO SIX HOURS AS NEEDED (PLEASE REMIND PATIENT TO SCHEDULE [facility] APPOINTMENT FOR FURTHER REFIL 18 g 4     No current " facility-administered medications for this visit.        Total data points: 2      This note has been dictated using Dragon dictation and there are potential grammatical and spelling inaccuracies that may occur.

## 2021-06-20 NOTE — LETTER
Letter by Edelmira Paul MD at      Author: Edelmira Paul MD Service: -- Author Type: --    Filed:  Encounter Date: 1/16/2020 Status: Signed       My Asthma Action Plan     Name: Shahriar Watters   YOB: 1950  Date: 1/16/2020   My doctor: Edelmira Paul MD   My clinic: [facility]        My Rescue Medicine:   Albuterol (Proair/Ventolin/Proventil HFA) 2-4 puffs EVERY 4 HOURS as needed. Use a spacer if recommended by your provider.   My Asthma Severity:   Intermittent/Exercise Induced  Know your asthma triggers: exercise or sports and cold air             GREEN ZONE   Good Control    I feel good    No cough or wheeze    Can work, sleep and play without asthma symptoms     Take your asthma control medicine every day.     1. If exercise triggers your asthma, take your rescue medication    15 minutes before exercise or sports, and    During exercise if you have asthma symptoms  2. Spacer to use with inhaler: If you have a spacer, make sure to use it with your inhaler             YELLOW ZONE Getting Worse  I have ANY of these:    I do not feel good    Cough or wheeze    Chest feels tight    Wake up at night 1. Keep taking your Green Zone medications  2. Start taking your rescue medicine:    every 20 minutes for up to 1 hour. Then every 4 hours for 24-48 hours.  3. If you stay in the Yellow Zone for more than 12-24 hours, contact your doctor.  4. If you do not return to the Green Zone in 12-24 hours or you get worse, start taking your oral steroid medicine if prescribed by your provider.           RED ZONE Medical Alert - Get Help  I have ANY of these:    I feel awful    Medicine is not helping    Breathing getting harder    Trouble walking or talking    Nose opens wide to breathe     1. Take your rescue medicine NOW  2. If your provider has prescribed an oral steroid medicine, start taking it NOW  3. Call your doctor NOW  4. If you are still in the  Red Zone after 20 minutes and you have not reached your doctor:    Take your rescue medicine again and    Call 911 or go to the emergency room right away    See your regular doctor within 2 weeks of an Emergency Room or Urgent Care visit for follow-up treatment.          Annual Reminders:  Meet with Asthma Educator,  Flu Shot in the Fall, consider Pneumonia Vaccination for patients with asthma (aged 19 and older).    Pharmacy:   [id number] - [place], [place] - [address] [place] [address]  Phone: [phone] Fax: [phone]      Electronically signed by Edelmira Paul MD   Date: 01/16/20                      Asthma Triggers  How To Control Things That Make Your Asthma Worse    Triggers are things that make your asthma worse.  Look at the list below to help you find your triggers and what you can do about them.  You can help prevent asthma flare-ups by staying away from your triggers.      Trigger                                                          What you can do   Cigarette Smoke  Tobacco smoke can make asthma worse. Do not allow smoking in your home, car or around you.  Be sure no one smokes at a rere day care or school.  If you smoke, ask your health care provider for ways to help you quit.  Ask family members to quit too.  Ask your health care provider for a referral to Quit Plan to help you quit smoking, or call 7-754-226-PLAN.     Colds, Flu, Bronchitis  These are common triggers of asthma. Wash your hands often.  Dont touch your eyes, nose or mouth.  Get a flu shot every year.     Dust Mites  These are tiny bugs that live in cloth or carpet. They are too small to see. Wash sheets and blankets in hot water every week.   Encase pillows and mattress in dust mite proof covers.  Avoid having carpet if you can. If you have carpet, vacuum weekly.   Use a dust mask and HEPA vacuum.   Pollen and Outdoor Mold  Some people are allergic to trees, grass, or  weed pollen, or molds. Try to keep your windows closed.  Limit time out doors when pollen count is high.   Ask you health care provider about taking medicine during allergy season.     Animal Dander  Some people are allergic to skin flakes, urine or saliva from pets with fur or feathers. Keep pets with fur or feathers out of your home.    If you cant keep the pet outdoors, then keep the pet out of your bedroom.  Keep the bedroom door closed.  Keep pets off cloth furniture and away from stuffed toys.     Mice, Rats, and Cockroaches  Some people are allergic to the waste from these pests.   Cover food and garbage.  Clean up spills and food crumbs.  Store grease in the refrigerator.   Keep food out of the bedroom.   Indoor Mold  This can be a trigger if your home has high moisture. Fix leaking faucets, pipes, or other sources of water.   Clean moldy surfaces.  Dehumidify basement if it is damp and smelly.   Smoke, Strong Odors, and Sprays  These can reduce air quality. Stay away from strong odors and sprays, such as perfume, powder, hair spray, paints, smoke incense, paint, cleaning products, candles and new carpet.   Exercise or Sports  Some people with asthma have this trigger. Be active!  Ask your doctor about taking medicine before sports or exercise to prevent symptoms.    Warm up for 5-10 minutes before and after sports or exercise.     Other Triggers of Asthma  Cold air:  Cover your nose and mouth with a scarf.  Sometimes laughing or crying can be a trigger.  Some medicines and food can trigger asthma.

## 2021-06-21 NOTE — PROGRESS NOTES
ASSESSMENT & PLAN    No problem-specific Assessment & Plan notes found for this encounter.      Shahriar was seen today for establish care.    Diagnoses and all orders for this visit:    Well adult exam  -     Cancel: Dehydroepiandrosterone Sulfate, Serum (DHEAS)  -     Cancel: LipoFit by NMR  -     Cancel: Comprehensive Metabolic Panel  -     Cancel: HM1(CBC and Differential)  -     Cancel: C -Reactive Protein, High Sensitivity  -     Cancel: Vitamin D, Total (25-Hydroxy)  -     Cancel: Vitamin B12  -     Cancel: Thyroid Cascade  -     Cancel: Thyroid Peroxidase Antibody  -     Cancel: Erythrocyte Sedimentation Rate  -     Cancel: Testosterone, Free and Total (BTEST)  -     Cancel: Prolactin  -     Cancel: HM1 (CBC with Diff)    Traumatic brain injury, with loss of consciousness of 1 hour to 5 hours 59 minutes, initial encounter (H)    Gynecomastia  -     Cancel: Testosterone, Free and Total (BTEST)  -     Cancel: Prolactin    Anxiety  -     Cancel: Dehydroepiandrosterone Sulfate, Serum (DHEAS)  -     Cancel: Vitamin D, Total (25-Hydroxy)  -     Cancel: Thyroid Cascade  -     Cancel: Thyroid Peroxidase Antibody    Screening for prostate cancer  -     Cancel: PSA (Prostatic-Specific Antigen), Annual Screen    Vitamin B12 deficiency (non anemic)  -     Cancel: Vitamin B12    Retina disorder  -     Ambulatory referral to Retina Clinic    Actinic keratosis  -     Ambulatory referral to Dermatology    Neoplasm of uncertain behavior of skin  -     Ambulatory referral to Dermatology    Lipoma of skin and subcutaneous tissue  -     Ambulatory referral to General Surgery    Other orders  -     Influenza High Dose, Seasonal 65+ yrs        Patient Instructions   Read the end of Alzheimer's by Dennis Greer    Nutrition: Dr. Merchant's Nutrition for Recondition    Diet first, You are what you eat!     Whole FOODS Diet  You look at it and can name it  Foods that come from a plant, animal, tree of the sea.  High Fat, High Protein and  "Low Glycemic Carbohydrates as a rule    For High Fat Eat----   ----------------------Nuts,Veggies, Fish and Lean Meat    For High Protein Eat----   -----------------------Fish, Lean Meats, Beans, Nuts, and Quinoa/Whole grains    Focus on \"Whole Foods\":  You know what the food is by looking at it and comes from a plant, tree, animal or the sea.  Single source organically grown if possible.    AVOID SIMPLE SUGARS:Avoid if possible any added sugars.   Mainly lots of sugary fruit drinks and foods and beverages that contain High Fructose Corn Syrup and other ADDED Sugars      Remember  Insulin has a two fold job--  -------Process Sugars to feed the brain and other tissues  And  -------Store Fat for a rainy day --Our waistline is stored energy.      Donte meets Vegan See the Shopping List  Meats Leans preferably  [place], [place],Anchovy,Sardines,Herring and Chicken  Vegetables Greens Abundant and two servings daily and a Randolph Plate if possible  Eat a rainbow diet of colors daily that means chemistry.    DIANA PATTON: Colors of the Spectrum,  Remember chemistry!  Eat a rainbow with All the Colors of the Spectrum    The Color of the plant is a phytochemical that is a form Communication and activates our bodies machinery and interacts with our genes.     The Color Spectrum Diet should include on food from each color daily:      Something Red  eg  Apple Berries Pepper etc  Something Orange eg Sweet potato, Orange, Squash etc   Something Yellow  Squash, Lemon, Peppers, Pineapple  Something Green Spinach, Kale, Chard Peas, Green Beans etc  Something Blue/Purple   Blueberries, Prunes, Eggplant, Onions, Cauliflower  Something White Parsnips, Leeks, Garlic  Nuts and Beans/Legumes for good fats and proteins  Eggs   Dairy* High Protein/High Fat and low Carb eg Cottage Cheese  Whole Grains only  No refined                 Focused Nutrition:  Eat Clean and Whole Foods          For Carbohydrates Eat as a rule: Nuts, Vegetables, and " Beans and Fresh Fruit as Snacks    Probiotics and Prebiotics support digestion and our immune system!    Foods that support this are, among others:    Pros: Kefir, Kombucha, Miso, Pickled Vegetables, Joann Kraut, Yogurt, Tempeh,     Pre: Asparagus, Bananas, Eggplant, Garlic, Honey, Kefir, Leeks, Legumes, Onions, Peas, Whole Grains, and Yogurt    Nutrients support our cellular machinery:  High nutrient food support this.    Rest helps digest.  We need restorative sleep  8- 10 hours.    Fiber Patrick Seed or Flax Seed 2-3 tablespoons daily for fiber and Omega 3s    Drink Green Tea Daily  Use Turmeric and Jess, Oregano and Thyme in cooking daily        Exercise 4 days Cardio ( walking 2.5 miles/per to 5 mph example) 3 days Resistance Weight Training    Alcohol  No Beer while trying to lose Weight,  Clear Alcohol or Wine max  1daily    Meditation and reflect on the Positive things in Life and the Day every day for 10 - 20 minutes    Sleep 8 - 10 Hours  Melatonin 5 - 10 mg at 1 hour prior to target bedtime    Laugh Daily find or do at least 3 times a day, something that makes you laugh, Comics, You Tube etc eg. Daily Office Joke    *Avoid Watching News at Bedtime reflect on daily Positives    Core Work out for stability  Start with gentle Yoga        Return in about 3 months (around 2/23/2019).            CHIEF COMPLAINT: Shahriar Watters had concerns including Establish Care (new pt, pt wants to discuss inhaler).    [place]: 1.............. had concerns including Establish Care (new pt, pt wants to discuss inhaler).    1. Well adult exam    2. Traumatic brain injury, with loss of consciousness of 1 hour to 5 hours 59 minutes, initial encounter (H)    3. Gynecomastia    4. Anxiety    5. Screening for prostate cancer    6. Vitamin B12 deficiency (non anemic)    7. Retina disorder    8. Actinic keratosis    9. Neoplasm of uncertain behavior of skin    10. Lipoma of skin and subcutaneous tissue          CC:             Why are  you here today?                              Well Exam    Refer from Ceferino Mauricio    Prior TBI    Large Lipoma  PRior Eval   Told to Remove.  Has Gotten Bigger and more troublesome    Skin CHanges     Anxiety            Any other Problems in order of Priority:        SUBJECTIVE:  Shahriar Watters is a 68 y.o. male    Past Medical History:   Diagnosis Date     Closed skull base fracture-concussion (H) 8/3/2017     Lipoma      Nondisplaced lateral mass fracture of first cervical vertebra with routine healing 8/3/2017     Unspecified occipital condyle fracture, subsequent encounter for fracture with routine healing 8/3/2017     Past Surgical History:   Procedure Laterality Date     NO PAST SURGERIES       TONSILLECTOMY AND ADENOIDECTOMY       WISDOM TOOTH EXTRACTION       WRIST FRACTURE SURGERY       Patient has no known allergies.  Current Outpatient Medications   Medication Sig Dispense Refill     cholecalciferol, vitamin D3, 1,000 unit tablet Take 1,000 Units by mouth.       PROAIR HFA 90 mcg/actuation inhaler INHALE 1-2 PUFFS BY MOUTH EVERY 4-6 HOURS AS NEEDED 8.5 Inhaler 5     albuterol (PROVENTIL HFA) 90 mcg/actuation inhaler INHALE ONE OR TWO PUFFS BY MOUTH EVERY FOUR TO SIX HOURS AS NEEDED (PLEASE REMIND PATIENT TO SCHEDULE [facility] APPOINTMENT FOR FURTHER REFIL       cyanocobalamin 1000 MCG tablet Take 1,000 mcg by mouth.       No current facility-administered medications for this visit.      Family History   Problem Relation Age of Onset     Lung cancer Father      Colon cancer Brother 51        colon cancer      Dementia Mother 83        2014     Social History     Socioeconomic History     Marital status:      Spouse name: None     Number of children: None     Years of education: None     Highest education level: None   Social Needs     Financial resource strain: None     Food insecurity - worry: None     Food insecurity - inability: None     Transportation needs - medical: None     Transportation  needs - non-medical: None   Occupational History     None   Tobacco Use     Smoking status: Former Smoker     Smokeless tobacco: Never Used   Substance and Sexual Activity     Alcohol use: Yes     Alcohol/week: 4.8 oz     Types: 4 Standard drinks or equivalent, 4 Glasses of wine per week     Drug use: No     Sexual activity: Yes     Partners: Female   Other Topics Concern     None   Social History Narrative     None     Patient Active Problem List   Diagnosis     Mild intermittent asthma     Benign Adenomatous Polyp Of The Large Intestine     Ophthalmoplegic Migraine Headache     Vitamin B12 Deficiency     TBI (traumatic brain injury) (H)                                              SOCIAL: He  reports that he has quit smoking. he has never used smokeless tobacco. He reports that he drinks about 4.8 oz of alcohol per week. He reports that he does not use drugs.    REVIEW OF SYSTEMS:   Family history not pertinent to chief complaint or presenting problem    Review of Systems:      Nervous System:  No headache, paresthesia or dizziness or fainting                                  Ears: No hearing loss or ringing in the ears    Eyes: No blurring of vision, Double Vision            No redness, itching or dryness.    Nose: No nosebleed or loss of smell    Mouth: No mouth sores or  coated tongue    Throat: No hoarseness or difficulty swallowing    Neck: No enlarged thyroid or lymph nodes.    Heart: No chest pain, palpitation or irregular heartbeat.                  Lungs: No shortness of breath, wheezing or hemoptysis.    Gastrointestinal: No nausea or vomiting, melena or blood in stools.    Kidney/Bladdr: No polyuria, polydipsia, or hematuria.                             Genital/Sexual: No Sex function Changes                                Skin: No rash    Muscles/Joints/Bones: No Muscle morning stiffness, No Effusion of a Joint     Review of systems otherwise negative as requested from patient, except   Those positive  ROS outlined and discussed in [place].    OBJECTIVE:  /72 (Patient Site: Left Arm, Patient Position: Sitting, Cuff Size: Adult Regular)   Pulse 77   Resp 16   Wt 210 lb (95.3 kg)   SpO2 97%   BMI 25.56 kg/m      GENERAL:     No acute distress.   Alert and oriented X 3         Physical:    Bilateral cataracts  TMs are clear  Oropharynx gingival recession multiple crowns  TMs are clear otherwise  His mucosa is clear  No carotid bruits  Lungs are clear  Cardiac no murmur regular rate and rhythm  Abdomen soft flat nontender mild teeny umbilical hernia reducible  Gynecomastia  Seborrheic keratosis of the back  Dysplastic-looking nevus left lower back  Actinic keratoses face as well as trunk  Calves are supple  Large  lipoma measuring 15 cm x 10 cm right anterior thigh  Plan a prostate exam        ASSESSMENT & PLAN      Shahriar was seen today for establish care.    Diagnoses and all orders for this visit:    Well adult exam  -     Cancel: Dehydroepiandrosterone Sulfate, Serum (DHEAS)  -     Cancel: LipoFit by NMR  -     Cancel: Comprehensive Metabolic Panel  -     Cancel: HM1(CBC and Differential)  -     Cancel: C -Reactive Protein, High Sensitivity  -     Cancel: Vitamin D, Total (25-Hydroxy)  -     Cancel: Vitamin B12  -     Cancel: Thyroid Cascade  -     Cancel: Thyroid Peroxidase Antibody  -     Cancel: Erythrocyte Sedimentation Rate  -     Cancel: Testosterone, Free and Total (BTEST)  -     Cancel: Prolactin  -     Cancel: HM1 (CBC with Diff)    Traumatic brain injury, with loss of consciousness of 1 hour to 5 hours 59 minutes, initial encounter (H)    Gynecomastia  -     Cancel: Testosterone, Free and Total (BTEST)  -     Cancel: Prolactin    Anxiety  -     Cancel: Dehydroepiandrosterone Sulfate, Serum (DHEAS)  -     Cancel: Vitamin D, Total (25-Hydroxy)  -     Cancel: Thyroid Cascade  -     Cancel: Thyroid Peroxidase Antibody    Screening for prostate cancer  -     Cancel: PSA (Prostatic-Specific Antigen),  Annual Screen    Vitamin B12 deficiency (non anemic)  -     Cancel: Vitamin B12    Retina disorder  -     Ambulatory referral to Retina Clinic    Actinic keratosis  -     Ambulatory referral to Dermatology    Neoplasm of uncertain behavior of skin  -     Ambulatory referral to Dermatology    Lipoma of skin and subcutaneous tissue  -     Ambulatory referral to General Surgery    Other orders  -     Influenza High Dose, Seasonal 65+ yrs        Return in about 3 months (around 2/23/2019).       Anticipatory Guidance and Symptomatic Cares Discussed   Advised to call back directly if there are further questions, or if these symptoms fail to improve as anticipated or worsen.  Return to clinic if patient has a clinical concern that warrants an exam.         I spent 40 minutes with this patient face to face, of which 50% or greater was spent in counseling and coordination of care with regards to Shahriar was seen today for establish care.    Diagnoses and all orders for this visit:    Well adult exam  -     Cancel: Dehydroepiandrosterone Sulfate, Serum (DHEAS)  -     Cancel: LipoFit by NMR  -     Cancel: Comprehensive Metabolic Panel  -     Cancel: HM1(CBC and Differential)  -     Cancel: C -Reactive Protein, High Sensitivity  -     Cancel: Vitamin D, Total (25-Hydroxy)  -     Cancel: Vitamin B12  -     Cancel: Thyroid Cascade  -     Cancel: Thyroid Peroxidase Antibody  -     Cancel: Erythrocyte Sedimentation Rate  -     Cancel: Testosterone, Free and Total (BTEST)  -     Cancel: Prolactin  -     Cancel: HM1 (CBC with Diff)    Traumatic brain injury, with loss of consciousness of 1 hour to 5 hours 59 minutes, initial encounter (H)    Gynecomastia  -     Cancel: Testosterone, Free and Total (BTEST)  -     Cancel: Prolactin    Anxiety  -     Cancel: Dehydroepiandrosterone Sulfate, Serum (DHEAS)  -     Cancel: Vitamin D, Total (25-Hydroxy)  -     Cancel: Thyroid Cascade  -     Cancel: Thyroid Peroxidase Antibody    Screening for  prostate cancer  -     Cancel: PSA (Prostatic-Specific Antigen), Annual Screen    Vitamin B12 deficiency (non anemic)  -     Cancel: Vitamin B12    Retina disorder  -     Ambulatory referral to Retina Clinic    Actinic keratosis  -     Ambulatory referral to Dermatology    Neoplasm of uncertain behavior of skin  -     Ambulatory referral to Dermatology    Lipoma of skin and subcutaneous tissue  -     Ambulatory referral to General Surgery    Other orders  -     Influenza High Dose, Seasonal 65+ yrs        Nestor Cameron MD  [facility] [address] [phone]

## 2021-06-21 NOTE — LETTER
Letter by Nestor Cameron MD at      Author: Nestor Cameron MD Service: -- Author Type: --    Filed:  Encounter Date: 3/26/2021 Status: (Other)       My Asthma Action Plan     Name: Shahriar Watters   YOB: 1950  Date: 3/26/2021   My doctor: Nestor Cameron MD   My clinic: [facility]        My Rescue Medicine:   Albuterol (Proair/Ventolin/Proventil HFA) 2-4 puffs EVERY 4 HOURS as needed. Use a spacer if recommended by your provider.      Your asthma is been well controlled continue Flovent 2 puffs twice daily My Asthma Severity:   Mild Persistent  Know your asthma triggers: Patient is unaware of triggers             GREEN ZONE   Good Control    I feel good    No cough or wheeze    Can work, sleep and play without asthma symptoms     Take your asthma control medicine every day.     1. If exercise triggers your asthma, take your rescue medication    15 minutes before exercise or sports, and    During exercise if you have asthma symptoms  2. Spacer to use with inhaler: If you have a spacer, make sure to use it with your inhaler             YELLOW ZONE Getting Worse  I have ANY of these:    I do not feel good    Cough or wheeze    Chest feels tight    Wake up at night 1. Keep taking your Green Zone medications  2. Start taking your rescue medicine:    every 20 minutes for up to 1 hour. Then every 4 hours for 24-48 hours.  3. If you stay in the Yellow Zone for more than 12-24 hours, contact your doctor.  4. If you do not return to the Green Zone in 12-24 hours or you get worse, start taking your oral steroid medicine if prescribed by your provider.           RED ZONE Medical Alert - Get Help  I have ANY of these:    I feel awful    Medicine is not helping    Breathing getting harder    Trouble walking or talking    Nose opens wide to breathe     1. Take your rescue medicine NOW  2. If your provider has prescribed an oral steroid medicine, start taking it NOW  3. Call your  doctor NOW  4. If you are still in the Red Zone after 20 minutes and you have not reached your doctor:    Take your rescue medicine again and    Call 911 or go to the emergency room right away    See your regular doctor within 2 weeks of an Emergency Room or Urgent Care visit for follow-up treatment.          Annual Reminders:  Meet with Asthma Educator,  Flu Shot in the Fall, consider Pneumonia Vaccination for patients with asthma (aged 19 and older).    Pharmacy:   [id number] - [place], [place] - [address] [place] [address]  Phone: [phone] Fax: [phone]      Electronically signed by Nestor Cameron MD   Date: 03/26/21                      Asthma Triggers  How To Control Things That Make Your Asthma Worse    Triggers are things that make your asthma worse.  Look at the list below to help you find your triggers and what you can do about them.  You can help prevent asthma flare-ups by staying away from your triggers.      Trigger                                                          What you can do   Cigarette Smoke  Tobacco smoke can make asthma worse. Do not allow smoking in your home, car or around you.  Be sure no one smokes at a rere day care or school.  If you smoke, ask your health care provider for ways to help you quit.  Ask family members to quit too.  Ask your health care provider for a referral to Quit Plan to help you quit smoking, or call 6-847-189-PLAN.     Colds, Flu, Bronchitis  These are common triggers of asthma. Wash your hands often.  Dont touch your eyes, nose or mouth.  Get a flu shot every year.     Dust Mites  These are tiny bugs that live in cloth or carpet. They are too small to see. Wash sheets and blankets in hot water every week.   Encase pillows and mattress in dust mite proof covers.  Avoid having carpet if you can. If you have carpet, vacuum weekly.   Use a dust mask and HEPA vacuum.   Pollen and Outdoor Mold  Some people are  allergic to trees, grass, or weed pollen, or molds. Try to keep your windows closed.  Limit time out doors when pollen count is high.   Ask you health care provider about taking medicine during allergy season.     Animal Dander  Some people are allergic to skin flakes, urine or saliva from pets with fur or feathers. Keep pets with fur or feathers out of your home.    If you cant keep the pet outdoors, then keep the pet out of your bedroom.  Keep the bedroom door closed.  Keep pets off cloth furniture and away from stuffed toys.     Mice, Rats, and Cockroaches  Some people are allergic to the waste from these pests.   Cover food and garbage.  Clean up spills and food crumbs.  Store grease in the refrigerator.   Keep food out of the bedroom.   Indoor Mold  This can be a trigger if your home has high moisture. Fix leaking faucets, pipes, or other sources of water.   Clean moldy surfaces.  Dehumidify basement if it is damp and smelly.   Smoke, Strong Odors, and Sprays  These can reduce air quality. Stay away from strong odors and sprays, such as perfume, powder, hair spray, paints, smoke incense, paint, cleaning products, candles and new carpet.   Exercise or Sports  Some people with asthma have this trigger. Be active!  Ask your doctor about taking medicine before sports or exercise to prevent symptoms.    Warm up for 5-10 minutes before and after sports or exercise.     Other Triggers of Asthma  Cold air:  Cover your nose and mouth with a scarf.  Sometimes laughing or crying can be a trigger.  Some medicines and food can trigger asthma.

## 2021-06-23 NOTE — TELEPHONE ENCOUNTER
"Pt reports mild cold symptoms x four weeks.  \"Primarily phlegm in the throat with a mild cough.\"  \"Everything worsened about 7 days ago.\"  Cough has become \"painful over the past 4 days.\"  Cough is now productive.  Ear pain, one ear (mild).  Fever 3 days ago -> \"was about 99.\"  Pt is uncertain of fever today.  Body aches.    Pt agrees to clinical eval.  No open appt slots at pt's primary clinic.  Pt decides for [facility] walk-in-clinic.    Sharlene Alatorre RN BSBA  [facility] RN Triage     Reason for Disposition    Sinus congestion (pressure, fullness) present > 10 days    Protocols used: SINUS PAIN AND CONGESTION-A-OH      "

## 2021-06-23 NOTE — TELEPHONE ENCOUNTER
New Appointment Needed  What is the reason for the visit:    Pre-Op Appt Request  When is the surgery? :  3/7/19  Where is the surgery?:   [facility]  Who is the surgeon? :  Canelo Ross  What type of surgery is being done?: rt hip  Provider Preference: PCP only  How soon do you need to be seen?: next week  Waitlist offered?: No  Okay to leave a detailed message:  Yes

## 2021-06-23 NOTE — PROGRESS NOTES
Chief Complaint   Patient presents with     Cough     painful coughing and ongoing congestion, fevers, headaches, x 4 days worseinging        HPI:  Shahriar Watters is a 68 y.o. male with PMHx asthma and latent TB 30 years ago that was treated with abx at the time who presents today complaining of cough, fever, congestion, and HA x 4 days. Patient has been using his inhaler more frequently. The cough has been a tickle sensation that occurs in spurts that seem uncontrollable. When he coughs he has has some soreness in his chest. He reports some productivity with white mucous in the sputum. He denies any hemoptysis or weight loss. He has been blowing his nose which has had some mucous and blood. No known sick contacts. Yesterday he came back from [place]; he was there for 9-10 days. He believes he had a flu vaccine this year. In general his symptoms seem to be improving. He has been taking some vitamin supplements, ASA, and Benadryl OTC, but nothing else for his symptoms. He denies any hx of DM or HTN.    History obtained from the patient.    Problem List:  2018-05: TBI (traumatic brain injury) (H)  2017-08: Closed skull base fracture-concussion (H)  2017-08: Nondisplaced lateral mass fracture of first cervical   vertebra with routine healing  2017-08: Unspecified occipital condyle fracture, subsequent encounter   for fracture with routine healing  Cerumen Impaction  Mild intermittent asthma  Benign Adenomatous Polyp Of The Large Intestine  Ophthalmoplegic Migraine Headache  Vitamin B12 Deficiency  Lump In / On The Skin      Past Medical History:   Diagnosis Date     Closed skull base fracture-concussion (H) 8/3/2017     Lipoma      Nondisplaced lateral mass fracture of first cervical vertebra with routine healing 8/3/2017     Unspecified occipital condyle fracture, subsequent encounter for fracture with routine healing 8/3/2017       Social History     Tobacco Use     Smoking status: Former Smoker     Smokeless tobacco:  Never Used   Substance Use Topics     Alcohol use: Yes     Alcohol/week: 4.8 oz     Types: 4 Standard drinks or equivalent, 4 Glasses of wine per week       Review of Systems   Constitutional: Positive for diaphoresis (mild) and fever (TMax99). Negative for unexpected weight change.   HENT: Positive for congestion, ear pain (right intermittent x 1 month) and rhinorrhea. Negative for ear discharge and sore throat.    Respiratory: Positive for cough and chest tightness. Negative for wheezing.    Cardiovascular: Negative for chest pain.   Gastrointestinal: Negative for nausea and vomiting.   Neurological: Positive for headaches (right sided. ).       Vitals:    02/04/19 0846   BP: 120/86   Pulse: 73   Resp: 16   Temp: 99.2  F (37.3  C)   TempSrc: Oral   SpO2: 98%   Weight: 208 lb 12.8 oz (94.7 kg)       Physical Exam   Constitutional: He appears well-developed and well-nourished. No distress.   HENT:   Head: Normocephalic and atraumatic.   Right Ear: Tympanic membrane, external ear and ear canal normal.   Left Ear: Tympanic membrane, external ear and ear canal normal.   Eyes: Conjunctivae are normal. Right eye exhibits no discharge. Left eye exhibits no discharge.   Neck: Normal range of motion.   Cardiovascular: Normal rate, regular rhythm and normal heart sounds.   Pulmonary/Chest: Effort normal and breath sounds normal. No respiratory distress.   Lymphadenopathy:     He has no cervical adenopathy.   Skin: He is not diaphoretic.   Psychiatric: He has a normal mood and affect. His behavior is normal. Judgment and thought content normal.     Clinical Decision Making:  Physical exam is benign. Lungs are CTA. Patient admits that sxs are improving, his main concern is whether or not he is contagious for work. Recommended that he could go to work today as his symptoms are getting better and he has been afebrile. Educated on hand hygiene and cough covering.     At the end of the encounter, I discussed results, diagnosis,  medications. Discussed red flags for immediate return to clinic/ER, as well as indications for follow up if no improvement. Patient understood and agreed to plan. Patient was stable for discharge.    1. Viral URI with cough           Patient Instructions   1) Increase fluids and rest  2) May Mucinex and Neti pot over the counter for congestion  3) Salt water gargles and lozenges can be helpful for cough relief  4) Follow up if symptoms worsen or do not continue to improve throughout the week.   5) Seek emergency medical attention for severe shortness of breath, CP, or dizziness. Follow up if any coughing up of blood.

## 2021-06-23 NOTE — PATIENT INSTRUCTIONS - HE
1) Increase fluids and rest  2) May Mucinex and Neti pot over the counter for congestion  3) Salt water gargles and lozenges can be helpful for cough relief  4) Follow up if symptoms worsen or do not continue to improve throughout the week.   5) Seek emergency medical attention for severe shortness of breath, CP, or dizziness. Follow up if any coughing up of blood.

## 2021-06-23 NOTE — PROGRESS NOTES
"Consult from Nestor Cameron MD for a lipoma.    HPI: Pt is here with concerns about a subcutaneous mass located Right HIp.  It has been present for 6 years..   This lesion is not tender.  The lesion has not drained.    Allergies:Patient has no known allergies.    Past Medical History:   Diagnosis Date     Closed skull base fracture-concussion (H) 8/3/2017     Lipoma      Nondisplaced lateral mass fracture of first cervical vertebra with routine healing 8/3/2017     Unspecified occipital condyle fracture, subsequent encounter for fracture with routine healing 8/3/2017       Past Surgical History:   Procedure Laterality Date     NO PAST SURGERIES       TONSILLECTOMY AND ADENOIDECTOMY       WISDOM TOOTH EXTRACTION       WRIST FRACTURE SURGERY         REVIEW OF SYSTEMS:  10 point ROS is negative except for; as mentioned above.    CURRENT MEDS:    Current Outpatient Medications:      albuterol (PROVENTIL HFA) 90 mcg/actuation inhaler, INHALE ONE OR TWO PUFFS BY MOUTH EVERY FOUR TO SIX HOURS AS NEEDED (PLEASE REMIND PATIENT TO SCHEDULE [facility] APPOINTMENT FOR FURTHER REFIL, Disp: , Rfl:      cholecalciferol, vitamin D3, 1,000 unit tablet, Take 1,000 Units by mouth., Disp: , Rfl:      cyanocobalamin 1000 MCG tablet, Take 1,000 mcg by mouth., Disp: , Rfl:      PROAIR HFA 90 mcg/actuation inhaler, INHALE 1-2 PUFFS BY MOUTH EVERY 4-6 HOURS AS NEEDED, Disp: 8.5 Inhaler, Rfl: 5    /54 (Patient Site: Right Arm, Patient Position: Sitting, Cuff Size: Adult Regular)   Pulse 81   Ht 6' 4\" (1.93 m)   Wt 210 lb 1.6 oz (95.3 kg)   SpO2 94%   BMI 25.57 kg/m    Body mass index is 25.57 kg/m .    EXAM:  GENERAL:Well developed he appears his stated age  HEAD & NECK: Extraocular motions intact, anicteric sclera,  ABDOMEN: Soft and nondistended, positive bowel sounds  LUNGS:  CTA  HEART:  RRR  EXTREMITIES: Full mobility,   INTEGUMENT: The patient has a subcutaneous lesion located Right hip.   The lesion is large, 15 cm " wide.    Assessment/Plan: The pt has a  15 cm  subcutaneous lesion located on the R hip.    This lesion is a Lipoma. These lesion is growing in size and is deforming.  With these features I recommend removal of this lesion.     He would like to have these lesions removed. I discussed this with he. I discussed the risk of bleeding and infection with the patient. We will get this scheduled through our clinic.    Jorge Cordero MD  [phone]  [facility] Department of Surgery

## 2021-06-24 NOTE — ANESTHESIA PREPROCEDURE EVALUATION
Anesthesia Evaluation      Patient summary reviewed   No history of anesthetic complications     Airway   Mallampati: I  Neck ROM: full   Pulmonary - normal exam    breath sounds clear to auscultation  (+) asthma  mild,  well controlled,                          Cardiovascular - negative ROS  Rhythm: regular  Rate: normal,         Neuro/Psych      Comments: Hx skull fx/concussion  Hx c-spine fx, healed    Endo/Other - negative ROS      GI/Hepatic/Renal - negative ROS           Dental    (+) caps                       Anesthesia Plan  Planned anesthetic: MAC    ASA 2     Anesthetic plan and risks discussed with: patient    Post-op plan: routine recovery

## 2021-06-24 NOTE — ANESTHESIA CARE TRANSFER NOTE
Last vitals:   Vitals:    03/07/19 1440   BP: 101/56   Pulse: 87   Resp: 18   Temp: 36.7  C (98  F)   SpO2: 97%     Patient's level of consciousness is drowsy  Spontaneous respirations: yes  Maintains airway independently: yes  Dentition unchanged: yes  Oropharynx: oropharynx clear of all foreign objects    QCDR Measures:  ASA# 20 - Surgical Safety Checklist: WHO surgical safety checklist completed prior to induction    PQRS# 430 - Adult PONV Prevention: 4558F - Pt received => 2 anti-emetic agents (different classes) preop & intraop  ASA# 8 - Peds PONV Prevention: NA - Not pediatric patient, not GA or 2 or more risk factors NOT present  PQRS# 424 - Nikki-op Temp Management: 4559F - At least one body temp DOCUMENTED => 35.5C or 95.9F within required timeframe  PQRS# 426 - PACU Transfer Protocol: - Transfer of care checklist used  ASA# 14 - Acute Post-op Pain: ASA14B - Patient did NOT experience pain >= 7 out of 10

## 2021-06-24 NOTE — PROGRESS NOTES
Preoperative Exam    Scheduled Procedure: Removal of lump on right hip  Surgery Date:  3/7/19  Surgery Location: [facility], fax [phone]    Surgeon:  Dr. Blunt    Assessment/Plan:     Problem List Items Addressed This Visit     Mild intermittent asthma      Other Visit Diagnoses     Lipoma of right lower extremity    -  Primary    Relevant Orders    Electrocardiogram Perform and Read (Completed)    Comprehensive Metabolic Panel    Nasal polyps                Surgical Procedure Risk: Low (reported cardiac risk generally < 1%)  Have you had prior anesthesia?: No  Have you or any family members had a previous anesthesia reaction:  No  Do you or any family members have a history of a clotting or bleeding disorder?: No  Cardiac Risk Assessment: no increased risk for major cardiac complications    Patient approved for surgery with general or local anesthesia.    Please Note:  none     Functional Status: Independent  Patient plans to recover at home alone.     Subjective:      Shahriar Watters is a 68 y.o. male who presents for a preoperative consultation.  How lon years     Getting bigger  Weird feeling  with sleep       All other systems reviewed and are negative, other than those listed in the HPI.    Pertinent History  Do you have difficulty breathing or chest pain after walking up a flight of stairs: No   Cross country skiing no change but wheeze   History of obstructive sleep apnea: No  Steroid use in the last 6 months: No  Frequent Aspirin/NSAID use: No   No Ibuprofen / Aleve / Aspirin   Prior Blood Transfusion: No  Prior Blood Transfusion Reaction: No  If for some reason prior to, during or after the procedure, if it is medically indicated, would you be willing to have a blood transfusion?:  There is no transfusion refusal.    Current Outpatient Medications   Medication Sig Dispense Refill     albuterol (PROVENTIL HFA) 90 mcg/actuation inhaler INHALE ONE OR TWO PUFFS BY MOUTH EVERY FOUR TO  SIX HOURS AS NEEDED (PLEASE REMIND PATIENT TO SCHEDULE [facility] APPOINTMENT FOR FURTHER REFIL       cholecalciferol, vitamin D3, 1,000 unit tablet Take 1,000 Units by mouth.       cyanocobalamin 1000 MCG tablet Take 1,000 mcg by mouth.       PROAIR HFA 90 mcg/actuation inhaler INHALE 1-2 PUFFS BY MOUTH EVERY 4-6 HOURS AS NEEDED 8.5 Inhaler 5     No current facility-administered medications for this visit.         No Known Allergies    Patient Active Problem List   Diagnosis     Mild intermittent asthma     Benign Adenomatous Polyp Of The Large Intestine     Ophthalmoplegic Migraine Headache     Vitamin B12 Deficiency     TBI (traumatic brain injury) (H)     Lipoma of right thigh       Past Medical History:   Diagnosis Date     Asthma      Closed skull base fracture-concussion (H) 8/3/2017     Lipoma      Nondisplaced lateral mass fracture of first cervical vertebra with routine healing 8/3/2017     Unspecified occipital condyle fracture, subsequent encounter for fracture with routine healing 8/3/2017       Past Surgical History:   Procedure Laterality Date     FRACTURE SURGERY       NO PAST SURGERIES       TONSILLECTOMY AND ADENOIDECTOMY       WISDOM TOOTH EXTRACTION       WRIST FRACTURE SURGERY         Social History     Socioeconomic History     Marital status:      Spouse name: Not on file     Number of children: Not on file     Years of education: Not on file     Highest education level: Not on file   Occupational History     Not on file   Social Needs     Financial resource strain: Not on file     Food insecurity:     Worry: Not on file     Inability: Not on file     Transportation needs:     Medical: Not on file     Non-medical: Not on file   Tobacco Use     Smoking status: Former Smoker     Smokeless tobacco: Never Used   Substance and Sexual Activity     Alcohol use: Yes     Alcohol/week: 4.8 oz     Types: 4 Glasses of wine, 4 Standard drinks or equivalent per week     Drug use: No     Sexual  activity: Yes     Partners: Female   Lifestyle     Physical activity:     Days per week: Not on file     Minutes per session: Not on file     Stress: Not on file   Relationships     Social connections:     Talks on phone: Not on file     Gets together: Not on file     Attends Yarsani service: Not on file     Active member of club or organization: Not on file     Attends meetings of clubs or organizations: Not on file     Relationship status: Not on file     Intimate partner violence:     Fear of current or ex partner: Not on file     Emotionally abused: Not on file     Physically abused: Not on file     Forced sexual activity: Not on file   Other Topics Concern     Not on file   Social History Narrative     Not on file       Patient Care Team:  Nestor Cameron MD as PCP - General (Family Medicine)          Objective:     Vitals:    03/01/19 0828   BP: 110/60   Pulse: 79   SpO2: 98%   Weight: 207 lb (93.9 kg)         Physical Exam:      Physical:  General Appearance: Healthy-appearingy.   Head:  No deformity  Eyes: Sclerae white, pupils equal and reactive, extra ocular movements intact   Ears: Well-positioned, well-formed pinnae; TM pearly white, translucent, no bulging   Nose: Clear, increased polypoid formation nasal tissue with swollen nasal mucosa slight swelling of the soft tissue below the nasal bridge  Throat: Lips, tongue, and mucosa are moist, pink and intact; tongue no thrush oral pharynx no injection or lesions  Neck: Supple, symmetric ROM no nodes   No carotid Buits  Chest: Lungs clear to auscultation, no retractions  Heart: Regular rate & rhythm, S1 S2, no murmur  Abdomen: Soft, non-tender, no masses; umbilical area normal   Pulses: Equal femoral pulses  : No hernia palpable   Extremities: Well-perfused, warm and dry, No Edema right proximal hip 12 cm x 6 cm subcutaneous mass  Palpable Pulses Bilateral  Neuro: Easily aroused good tone   Skin  No Rash    Recent Results (from the past 240 hour(s))    Electrocardiogram Perform and Read   Result Value Ref Range    SYSTOLIC BLOOD PRESSURE  mmHg    DIASTOLIC BLOOD PRESSURE  mmHg    VENTRICULAR RATE 66 BPM    ATRIAL RATE 66 BPM    P-R INTERVAL 186 ms    QRS DURATION 88 ms    Q-T INTERVAL 398 ms    QTC CALCULATION (BEZET) 417 ms    P Axis 77 degrees    R AXIS 30 degrees    T AXIS 66 degrees    MUSE DIAGNOSIS       Normal sinus rhythm with sinus arrhythmia  Normal ECG  When compared with ECG of 25-JUN-2017 19:22,  QT has shortened     Comprehensive Metabolic Panel   Result Value Ref Range    Sodium 142 136 - 145 mmol/L    Potassium 4.5 3.5 - 5.0 mmol/L    Chloride 106 98 - 107 mmol/L    CO2 26 22 - 31 mmol/L    Anion Gap, Calculation 10 5 - 18 mmol/L    Glucose 86 70 - 125 mg/dL    BUN 19 8 - 22 mg/dL    Creatinine 0.87 0.70 - 1.30 mg/dL    GFR MDRD Af Amer >60 >60 mL/min/1.73m2    GFR MDRD Non Af Amer >60 >60 mL/min/1.73m2    Bilirubin, Total 0.4 0.0 - 1.0 mg/dL    Calcium 9.5 8.5 - 10.5 mg/dL    Protein, Total 6.8 6.0 - 8.0 g/dL    Albumin 3.9 3.5 - 5.0 g/dL    Alkaline Phosphatase 55 45 - 120 U/L    AST 16 0 - 40 U/L    ALT 26 0 - 45 U/L           Patient Instructions     Hold all supplements, aspirin and NSAIDs for 7 days prior to surgery.  Follow your surgeon's direction on when to stop eating and drinking prior to surgery.  Your surgeon will be managing your pain after your surgery.    Remove all jewelry and metal piercings before your surgery.       I would hold your Minoa's wort the morning of surgery  Discussed with Dr. Blunt any concerns you have about wound healing and the risk for deep venous thrombosis as we discussed    You do have nasal polyps  The biggest challenges trying to figure out what is triggering this    You can use over-the-counter Flonase 2 puffs in each nostril daily to help shrink these  You may want to see an allergist to try and get at the underlying cause      EKG:  Pending     Labs:  Labs pending at this time.  Results will be  reviewed when available.    Immunization History   Administered Date(s) Administered     Influenza high dose, seasonal 11/23/2018     Pneumo Conj 13-V (2010&after) 04/04/2017     Pneumo Polysac 23-V 05/14/2018     Td,adult,historic,unspecified 06/07/2004     Tdap 09/15/2012, 08/11/2014, 06/26/2017           Electronically signed by Nestor Caemron MD 03/01/19 8:26 AM

## 2021-06-24 NOTE — PATIENT INSTRUCTIONS - HE
Hold all supplements, aspirin and NSAIDs for 7 days prior to surgery.  Follow your surgeon's direction on when to stop eating and drinking prior to surgery.  Your surgeon will be managing your pain after your surgery.    Remove all jewelry and metal piercings before your surgery.       I would hold your Elena's wort the morning of surgery  Discussed with Dr. Blunt any concerns you have about wound healing and the risk for deep venous thrombosis as we discussed    You do have nasal polyps  The biggest challenges trying to figure out what is triggering this    You can use over-the-counter Flonase 2 puffs in each nostril daily to help shrink these  You may want to see an allergist to try and get at the underlying cause

## 2021-06-24 NOTE — ANESTHESIA POSTPROCEDURE EVALUATION
Patient: Shahriar GARZA Janene  EXCISION, LIPOMA,  Right Hip  Anesthesia type: MAC    Patient location: Phase II Recovery  Last vitals:   Vitals:    03/07/19 1500   BP: 105/60   Pulse: (!) 55   Resp: 16   Temp:    SpO2: 99%     Post vital signs: stable  Level of consciousness: awake and responds to simple questions  Post-anesthesia pain: pain controlled  Post-anesthesia nausea and vomiting: no  Pulmonary: unassisted, return to baseline  Cardiovascular: stable and blood pressure at baseline  Hydration: adequate  Anesthetic events: no    QCDR Measures:  ASA# 11 - Nikki-op Cardiac Arrest: ASA11B - Patient did NOT experience unanticipated cardiac arrest  ASA# 12 - Nikki-op Mortality Rate: ASA12B - Patient did NOT die  ASA# 13 - PACU Re-Intubation Rate: NA - No ETT / LMA used for case  ASA# 10 - Composite Anes Safety: ASA10A - No serious adverse event    Additional Notes:

## 2021-06-25 NOTE — PROGRESS NOTES
HPI: Pt is here for follow up lipoma removal of the left thigh with Dr. Blunt on 3/7/2019.   he is doing well.  Pain is well controlled, but he has recently started experiencing some/pulling pains.  The pain is tolerable and intermittent.  He has also noticed what he believes is fluid around where the lipoma was removed. he is eating well and denies fever and chills.         /61 (Patient Site: Right Arm, Patient Position: Sitting, Cuff Size: Adult Large)   Pulse 62   SpO2 98%     EXAM:  GENERAL:Appears well  SURGICAL WOUNDS:  Incisions healing well, no enduration or drainage.  Surrounding tissue is highly fluctuant with no stigmata of infection; minimal tenderness      Assessment/Plan: Seroma status post lipoma removal.  Discussed options with patient including aspiration of the fluid today and monitoring to see if the fluid would absorb naturally.  Was also discussed that the patient could reaccumulate the fluid after aspiration.  Knowing this, the patient decided to proceed with aspiration of the seroma today.  The area was cleaned with Betadine.  An 18-gauge needle was inserted and 115 mL of fluid was aspirated from the seroma.  Patient tolerated this well.     Overall, he is doing well after surgery.  He should follow-up as needed.     Leeann Krishnan , [facility]

## 2021-08-03 PROBLEM — S06.0XAA: Status: RESOLVED | Noted: 2017-08-03 | Resolved: 2018-05-14

## 2021-08-03 PROBLEM — S02.109A: Status: RESOLVED | Noted: 2017-08-03 | Resolved: 2018-05-14

## 2021-09-11 ENCOUNTER — HEALTH MAINTENANCE LETTER (OUTPATIENT)
Age: 71
End: 2021-09-11

## 2022-06-16 ENCOUNTER — NURSE TRIAGE (OUTPATIENT)
Dept: NURSING | Facility: CLINIC | Age: 72
End: 2022-06-16
Payer: COMMERCIAL

## 2022-06-16 NOTE — TELEPHONE ENCOUNTER
"Patient calling with questions about covid isolation and vaccination.    He tested positive at home today for Covid, he has no fever. His wife has fever but tested negative. He notes that he had his 2nd booster, wife had 1st booster.    Writer reviewed Covid information per Care Advice.    Sasha Cash RN  [place] Nurse Advisor  2:25 PM  6/16/2022    COVID 19 Nurse Triage Plan/Patient Instructions    Please be aware that novel coronavirus (COVID-19) may be circulating in the community. If you develop symptoms such as fever, cough, or SOB or if you have concerns about the presence of another infection including coronavirus (COVID-19), please contact your health care provider or visit [email or web address].     Disposition/Instructions    Additional COVID19 information to add for patients.   How can I protect others?  If you have symptoms (fever, cough, body aches or trouble breathing): Stay home and away from others (self-isolate) until:    At least 10 days have passed since your symptoms started, And     You ve had no fever--and no medicine that reduces fever--for 1 full day (24 hours), And      Your other symptoms have resolved (gotten better).     If you don t have symptoms, but a test showed that you have COVID-19 (you tested positive):    Stay home and away from others (self-isolate). Follow the tips under \"How do I self-isolate?\" below for 10 days (20 days if you have a weak immune system).    You don't need to be retested for COVID-19 before going back to school or work. As long as you're fever-free and feeling better, you can go back to school, work and other activities after waiting the 10 or 20 days.     How do I self-isolate?    Stay in your own room, even for meals. Use your own bathroom if you can.     Stay away from others in your home. No hugging, kissing or shaking hands. No visitors.    Don t go to work, school or anywhere else.     Clean  high touch  surfaces often (doorknobs, counters, " handles, etc.). Use a household cleaning spray or wipes. You ll find a full list on the EPA website:  www.epa.gov/pesticide-registration/list-n-disinfectants-use-against-sars-cov-2.    Cover your mouth and nose with a mask, tissue or washcloth to avoid spreading germs.    Wash your hands and face often. Use soap and water.    Caregivers in these groups are at risk for severe illness due to COVID-19:  o People 65 years and older  o People who live in a nursing home or long-term care facility  o People with chronic disease (lung, heart, cancer, diabetes, kidney, liver, immunologic)  o People who have a weakened immune system, including those who:  - Are in cancer treatment  - Take medicine that weakens the immune system, such as corticosteroids  - Had a bone marrow or organ transplant  - Have an immune deficiency  - Have poorly controlled HIV or AIDS  - Are obese (body mass index of 40 or higher)  - Smoke regularly    Caregivers should wear gloves while washing dishes, handling laundry and cleaning bedrooms and bathrooms.    Use caution when washing and drying laundry: Don t shake dirty laundry, and use the warmest water setting that you can.    For more tips, go to www.cdc.gov/coronavirus/2019-ncov/downloads/10Things.pdf.    How can I take care of myself?  1. Get lots of rest. Drink extra fluids (unless a doctor has told you not to).     2. Take Tylenol (acetaminophen) for fever or pain. If you have liver or kidney problems, ask your family doctor if it s okay to take Tylenol.     Adults can take either:     650 mg (two 325 mg pills) every 4 to 6 hours, or     1,000 mg (two 500 mg pills) every 8 hours as needed.     Note: Don t take more than 3,000 mg in one day.   Acetaminophen is found in many medicines (both prescribed and over-the-counter medicines). Read all labels to be sure you don t take too much.     For children, check the Tylenol bottle for the right dose. The dose is based on the child s age or  weight.    3. If you have other health problems (like cancer, heart failure, an organ transplant or severe kidney disease): Call your specialty clinic if you don t feel better in the next 2 days.    4. Know when to call 911: Emergency warning signs include:    Trouble breathing or shortness of breath    Pain or pressure in the chest that doesn t go away    Feeling confused like you haven t felt before, or not being able to wake up    Bluish-colored lips or face    What are the symptoms of COVID-19?     The most common symptoms are cough, fever and trouble breathing.     Less common symptoms include body aches, chills, diarrhea (loose, watery poops), fatigue (feeling very tired), headache, runny nose, sore throat and loss of smell.    COVID-19 can cause severe coughing (bronchitis) and lung infection (pneumonia).    How does it spread?     The virus may spread when a person coughs or sneezes into the air. The virus can travel about 6 feet this way, and it can live on surfaces.      Common  (household disinfectants) will kill the virus.    Who is at risk?  Anyone can catch COVID-19 if they re around someone who has the virus.    How can others protect themselves?     Stay away from people who have COVID-19 (or symptoms of COVID-19).    Wash hands often with soap and water. Or, use hand  with at least 60% alcohol.    Avoid touching the eyes, nose or mouth.     Wear a face mask when you go out in public, when sick or when caring for a sick person.    Where can I get more information?     [facility]: About COVID-19: [email or web address]    CDC: What to Do If You re Sick: www.cdc.gov/coronavirus/2019-ncov/about/steps-when-sick.html    CDC: Ending Home Isolation: www.cdc.gov/coronavirus/2019-ncov/hcp/disposition-in-home-patients.html     CDC: Caring for Someone: www.cdc.gov/coronavirus/2019-ncov/if-you-are-sick/care-for-someone.html     MD: Interim Guidance for Hospital Discharge to  Home: www.[facility]./diseases/coronavirus/hcp/hospdischarge.pdf    [facility] clinical trials (COVID-19 research studies): clinicalaffairs.[facility]-clinical-trials     Below are the COVID-19 hotlines at the [place] Department of Health ([facility]). Interpreters are available.   o For health questions: Call 188-842-5206 or 1-103.619.4342 (7 a.m. to 7 p.m.)  o For questions about schools and childcare: Call 776-250-2719 or 1-480.463.4259 (7 a.m. to 7 p.m.)          Thank you for taking steps to prevent the spread of this virus.  o Limit your contact with others.  o Wear a simple mask to cover your cough.  o Wash your hands well and often.    Resources    [facility]: About COVID-19: [email or web address]    CDC: What to Do If You're Sick: www.cdc.gov/coronavirus/2019-ncov/about/steps-when-sick.html    CDC: Ending Home Isolation: www.cdc.gov/coronavirus/2019-ncov/hcp/disposition-in-home-patients.html     CDC: Caring for Someone: www.cdc.gov/coronavirus/2019-ncov/if-you-are-sick/care-for-someone.html     [facility]: Interim Guidance for Hospital Discharge to Home: www.[facility]./diseases/coronavirus/hcp/hospdischarge.pdf    [facility] clinical trials (COVID-19 research studies): clinicalaffairs.[facility]-clinical-trials     Below are the COVID-19 hotlines at the [place] Department of [facility] ([facility]). Interpreters are available.   o For health questions: Call 915-740-7828 or 1-775.375.1470 (7 a.m. to 7 p.m.)  o For questions about schools and childcare: Call 577-913-4605 or 1-903.930.5686 (7 a.m. to 7 p.m.)                       Reason for Disposition    COVID-19 vaccine, questions about    COVID-19 vaccine, Frequently Asked Questions (FAQs)    Up-to-date on COVID-19 vaccination and exposure to COVID-19, Frequently Asked Questions (FAQs)    COVID-19 Prevention and Healthy Living, questions about    Protocols used: CORONAVIRUS (COVID-19) DIAGNOSED OR [id number]-U-WB  1.18.2022, CORONAVIRUS (COVID-19) VACCINE QUESTIONS AND [id number]-[id number]-WX 1.18.2022

## 2022-06-18 ENCOUNTER — HEALTH MAINTENANCE LETTER (OUTPATIENT)
Age: 72
End: 2022-06-18

## 2022-06-28 ENCOUNTER — TELEPHONE (OUTPATIENT)
Dept: FAMILY MEDICINE | Facility: CLINIC | Age: 72
End: 2022-06-28

## 2022-06-28 NOTE — TELEPHONE ENCOUNTER
Reason for Call:  Other call back    Detailed comments: Pt would like to maintain Dr Cameron as is PCP   Is important to patient to keep him as PCP        Would like to know where he is going    Phone Number Patient can be reached at: Cell number on file:    Telephone Information:   Mobile [phone]       Best Time: anytime    Can we leave a detailed message on this number? YES    Call taken on 6/28/2022 at 10:28 AM by Ora Hackett

## 2022-06-29 NOTE — TELEPHONE ENCOUNTER
Please use a standard answer:    Thank Shahriar for his confidence and convey my appreciation to have been his Doctor at [facility]    I received termination notice in March along with other [facility] physicians.    I accepted this termination and have found employment with a local group in [place] starting in August 2022 and my last Clinic day is 7/15/2022    Patients can search online for my new practice or see Linked In as of 7/16/2022    There are many fantastic [facility] Clinicians also     Jesica

## 2022-10-30 ENCOUNTER — HEALTH MAINTENANCE LETTER (OUTPATIENT)
Age: 72
End: 2022-10-30

## 2022-12-02 ENCOUNTER — LAB REQUISITION (OUTPATIENT)
Dept: LAB | Facility: CLINIC | Age: 72
End: 2022-12-02

## 2022-12-02 DIAGNOSIS — Z12.5 ENCOUNTER FOR SCREENING FOR MALIGNANT NEOPLASM OF PROSTATE: ICD-10-CM

## 2022-12-02 DIAGNOSIS — Z13.1 ENCOUNTER FOR SCREENING FOR DIABETES MELLITUS: ICD-10-CM

## 2022-12-02 DIAGNOSIS — G47.9 SLEEP DISORDER, UNSPECIFIED: ICD-10-CM

## 2022-12-02 DIAGNOSIS — Z13.220 ENCOUNTER FOR SCREENING FOR LIPOID DISORDERS: ICD-10-CM

## 2022-12-02 LAB
ALBUMIN SERPL BCG-MCNC: 4.1 G/DL (ref 3.5–5.2)
ALP SERPL-CCNC: 40 U/L (ref 40–129)
ALT SERPL W P-5'-P-CCNC: 27 U/L (ref 10–50)
ANION GAP SERPL CALCULATED.3IONS-SCNC: 9 MMOL/L (ref 7–15)
AST SERPL W P-5'-P-CCNC: 20 U/L (ref 10–50)
BILIRUB SERPL-MCNC: 0.4 MG/DL
BUN SERPL-MCNC: 21.3 MG/DL (ref 8–23)
CALCIUM SERPL-MCNC: 9.2 MG/DL (ref 8.8–10.2)
CHLORIDE SERPL-SCNC: 106 MMOL/L (ref 98–107)
CHOLEST SERPL-MCNC: 200 MG/DL
CREAT SERPL-MCNC: 0.92 MG/DL (ref 0.67–1.17)
DEPRECATED HCO3 PLAS-SCNC: 27 MMOL/L (ref 22–29)
GFR SERPL CREATININE-BSD FRML MDRD: 88 ML/MIN/1.73M2
GLUCOSE SERPL-MCNC: 86 MG/DL (ref 70–99)
HDLC SERPL-MCNC: 60 MG/DL
LDLC SERPL CALC-MCNC: 115 MG/DL
NONHDLC SERPL-MCNC: 140 MG/DL
POTASSIUM SERPL-SCNC: 4.9 MMOL/L (ref 3.4–5.3)
PROT SERPL-MCNC: 5.9 G/DL (ref 6.4–8.3)
PSA SERPL-MCNC: 0.74 NG/ML (ref 0–6.5)
SODIUM SERPL-SCNC: 142 MMOL/L (ref 136–145)
TRIGL SERPL-MCNC: 127 MG/DL

## 2022-12-02 PROCEDURE — 84443 ASSAY THYROID STIM HORMONE: CPT | Performed by: FAMILY MEDICINE

## 2022-12-02 PROCEDURE — G0103 PSA SCREENING: HCPCS | Performed by: FAMILY MEDICINE

## 2022-12-02 PROCEDURE — 80061 LIPID PANEL: CPT | Performed by: FAMILY MEDICINE

## 2022-12-02 PROCEDURE — 80053 COMPREHEN METABOLIC PANEL: CPT | Performed by: FAMILY MEDICINE

## 2022-12-03 LAB — TSH SERPL DL<=0.005 MIU/L-ACNC: 2.05 UIU/ML (ref 0.3–4.2)

## 2023-06-25 ENCOUNTER — HEALTH MAINTENANCE LETTER (OUTPATIENT)
Age: 73
End: 2023-06-25

## 2024-01-18 ENCOUNTER — LAB REQUISITION (OUTPATIENT)
Dept: LAB | Facility: CLINIC | Age: 74
End: 2024-01-18

## 2024-01-18 DIAGNOSIS — E53.8 DEFICIENCY OF OTHER SPECIFIED B GROUP VITAMINS: ICD-10-CM

## 2024-01-18 DIAGNOSIS — Z13.220 ENCOUNTER FOR SCREENING FOR LIPOID DISORDERS: ICD-10-CM

## 2024-01-18 DIAGNOSIS — E55.9 VITAMIN D DEFICIENCY, UNSPECIFIED: ICD-10-CM

## 2024-01-18 DIAGNOSIS — R41.9 UNSPECIFIED SYMPTOMS AND SIGNS INVOLVING COGNITIVE FUNCTIONS AND AWARENESS: ICD-10-CM

## 2024-01-18 PROCEDURE — 80053 COMPREHEN METABOLIC PANEL: CPT | Performed by: FAMILY MEDICINE

## 2024-01-18 PROCEDURE — G0103 PSA SCREENING: HCPCS | Performed by: FAMILY MEDICINE

## 2024-01-18 PROCEDURE — 82465 ASSAY BLD/SERUM CHOLESTEROL: CPT | Performed by: FAMILY MEDICINE

## 2024-01-18 PROCEDURE — 82306 VITAMIN D 25 HYDROXY: CPT | Performed by: FAMILY MEDICINE

## 2024-01-18 PROCEDURE — 82607 VITAMIN B-12: CPT | Performed by: FAMILY MEDICINE

## 2024-01-18 PROCEDURE — 84403 ASSAY OF TOTAL TESTOSTERONE: CPT | Performed by: FAMILY MEDICINE

## 2024-01-18 PROCEDURE — 84270 ASSAY OF SEX HORMONE GLOBUL: CPT | Performed by: FAMILY MEDICINE

## 2024-01-18 PROCEDURE — 84443 ASSAY THYROID STIM HORMONE: CPT | Performed by: FAMILY MEDICINE

## 2024-01-19 LAB
ALBUMIN SERPL BCG-MCNC: 4.4 G/DL (ref 3.5–5.2)
ALP SERPL-CCNC: 46 U/L (ref 40–150)
ALT SERPL W P-5'-P-CCNC: 26 U/L (ref 0–70)
ANION GAP SERPL CALCULATED.3IONS-SCNC: 9 MMOL/L (ref 7–15)
AST SERPL W P-5'-P-CCNC: 23 U/L (ref 0–45)
BILIRUB SERPL-MCNC: 0.5 MG/DL
BUN SERPL-MCNC: 20.7 MG/DL (ref 8–23)
CALCIUM SERPL-MCNC: 9.3 MG/DL (ref 8.8–10.2)
CHLORIDE SERPL-SCNC: 104 MMOL/L (ref 98–107)
CHOLEST SERPL-MCNC: 206 MG/DL
CREAT SERPL-MCNC: 0.98 MG/DL (ref 0.67–1.17)
DEPRECATED HCO3 PLAS-SCNC: 29 MMOL/L (ref 22–29)
EGFRCR SERPLBLD CKD-EPI 2021: 81 ML/MIN/1.73M2
FASTING STATUS PATIENT QL REPORTED: ABNORMAL
GLUCOSE SERPL-MCNC: 96 MG/DL (ref 70–99)
HDLC SERPL-MCNC: 58 MG/DL
LDLC SERPL CALC-MCNC: 129 MG/DL
NONHDLC SERPL-MCNC: 148 MG/DL
POTASSIUM SERPL-SCNC: 4 MMOL/L (ref 3.4–5.3)
PROT SERPL-MCNC: 6.8 G/DL (ref 6.4–8.3)
PSA SERPL DL<=0.01 NG/ML-MCNC: 1.29 NG/ML (ref 0–6.5)
SHBG SERPL-SCNC: 81 NMOL/L (ref 11–80)
SODIUM SERPL-SCNC: 142 MMOL/L (ref 135–145)
TRIGL SERPL-MCNC: 95 MG/DL
TSH SERPL DL<=0.005 MIU/L-ACNC: 2.71 UIU/ML (ref 0.3–4.2)
VIT B12 SERPL-MCNC: 1912 PG/ML (ref 232–1245)
VIT D+METAB SERPL-MCNC: 47 NG/ML (ref 20–50)

## 2024-01-23 LAB
TESTOST FREE SERPL-MCNC: 6.96 NG/DL
TESTOST SERPL-MCNC: 617 NG/DL (ref 240–950)

## 2024-05-15 ENCOUNTER — TELEPHONE (OUTPATIENT)
Dept: FAMILY MEDICINE | Facility: CLINIC | Age: 74
End: 2024-05-15
Payer: COMMERCIAL

## 2024-05-15 NOTE — TELEPHONE ENCOUNTER
LH,   Patient called.   Leaving for [place] on 5/24/24.   You saw patient's wife on 4/24/24 - Joanna Watters MRN [id number].   Okay to use approval required slot or route encounter to [employer]?  Thanks!  Leeann JUSTICE

## 2024-05-20 ENCOUNTER — OFFICE VISIT (OUTPATIENT)
Dept: FAMILY MEDICINE | Facility: CLINIC | Age: 74
End: 2024-05-20
Payer: COMMERCIAL

## 2024-05-20 VITALS
RESPIRATION RATE: 16 BRPM | TEMPERATURE: 98.6 F | HEIGHT: 76 IN | WEIGHT: 185.4 LBS | HEART RATE: 48 BPM | SYSTOLIC BLOOD PRESSURE: 118 MMHG | BODY MASS INDEX: 22.58 KG/M2 | OXYGEN SATURATION: 100 % | DIASTOLIC BLOOD PRESSURE: 63 MMHG

## 2024-05-20 DIAGNOSIS — Z71.84 TRAVEL ADVICE ENCOUNTER: Primary | ICD-10-CM

## 2024-05-20 PROCEDURE — 90691 TYPHOID VACCINE IM: CPT | Mod: GA | Performed by: NURSE PRACTITIONER

## 2024-05-20 PROCEDURE — 99402 PREV MED CNSL INDIV APPRX 30: CPT | Mod: 25 | Performed by: NURSE PRACTITIONER

## 2024-05-20 PROCEDURE — 90471 IMMUNIZATION ADMIN: CPT | Mod: GA | Performed by: NURSE PRACTITIONER

## 2024-05-20 RX ORDER — AZITHROMYCIN 500 MG/1
500 TABLET, FILM COATED ORAL DAILY
Qty: 3 TABLET | Refills: 0 | Status: SHIPPED | OUTPATIENT
Start: 2024-05-20 | End: 2024-05-23

## 2024-05-20 RX ORDER — ACETAZOLAMIDE 125 MG/1
TABLET ORAL
Qty: 14 TABLET | Refills: 0 | Status: SHIPPED | OUTPATIENT
Start: 2024-05-20

## 2024-05-20 ASSESSMENT — ASTHMA QUESTIONNAIRES
QUESTION_1 LAST FOUR WEEKS HOW MUCH OF THE TIME DID YOUR ASTHMA KEEP YOU FROM GETTING AS MUCH DONE AT WORK, SCHOOL OR AT HOME: NONE OF THE TIME
QUESTION_2 LAST FOUR WEEKS HOW OFTEN HAVE YOU HAD SHORTNESS OF BREATH: NOT AT ALL
ACT_TOTALSCORE: 25
QUESTION_5 LAST FOUR WEEKS HOW WOULD YOU RATE YOUR ASTHMA CONTROL: COMPLETELY CONTROLLED
QUESTION_3 LAST FOUR WEEKS HOW OFTEN DID YOUR ASTHMA SYMPTOMS (WHEEZING, COUGHING, SHORTNESS OF BREATH, CHEST TIGHTNESS OR PAIN) WAKE YOU UP AT NIGHT OR EARLIER THAN USUAL IN THE MORNING: NOT AT ALL
QUESTION_4 LAST FOUR WEEKS HOW OFTEN HAVE YOU USED YOUR RESCUE INHALER OR NEBULIZER MEDICATION (SUCH AS ALBUTEROL): NOT AT ALL
ACT_TOTALSCORE: 25

## 2024-05-20 ASSESSMENT — PAIN SCALES - GENERAL: PAINLEVEL: NO PAIN (0)

## 2024-05-20 NOTE — NURSING NOTE
Prior to immunization administration, verified patients identity using patient s name and date of birth. Please see Immunization Activity for additional information.     Screening Questionnaire for Adult Immunization    Are you sick today?   No   Do you have allergies to medications, food, a vaccine component or latex?   No   Have you ever had a serious reaction after receiving a vaccination?   No   Do you have a long-term health problem with heart, lung, kidney, or metabolic disease (e.g., diabetes), asthma, a blood disorder, no spleen, complement component deficiency, a cochlear implant, or a spinal fluid leak?  Are you on long-term aspirin therapy?   No   Do you have cancer, leukemia, HIV/AIDS, or any other immune system problem?   No   Do you have a parent, brother, or sister with an immune system problem?   No   In the past 3 months, have you taken medications that affect  your immune system, such as prednisone, other steroids, or anticancer drugs; drugs for the treatment of rheumatoid arthritis, Crohn s disease, or psoriasis; or have you had radiation treatments?   No   Have you had a seizure, or a brain or other nervous system problem?   No   During the past year, have you received a transfusion of blood or blood    products, or been given immune (gamma) globulin or antiviral drug?   No   For women: Are you pregnant or is there a chance you could become       pregnant during the next month?   No   Have you received any vaccinations in the past 4 weeks?   No     Immunization questionnaire answers were all negative.      Patient instructed to remain in clinic for 15 minutes afterwards, and to report any adverse reactions.     Screening performed by Selam Becerra on 5/20/2024 at 4:25 PM.

## 2024-05-20 NOTE — PROGRESS NOTES
"Nurse Note ( Pre-Travel Consult)    Itinerary:  [place]    Departure Date: 05/24    Return Date: 05/31    Length of Trip 1 week    Reason for Travel: Tourism         Urban or rural: rural    Accommodations: Hotel        IMMUNIZATION HISTORY  Have you received any immunizations within the past 4 weeks?  No  Have you ever fainted from having your blood drawn or from an injection?  No  Have you ever had a fever reaction to vaccination?  No  Have you ever had any bad reaction or side effect from any vaccination?  No  Do you live (or work closely) with anyone who has AIDS, an AIDS-like condition, any other immune disorder or who is on chemotherapy for cancer?  No  Do you have a family history of immunodeficiency?  No  Have you received any injection of immune globulin or any blood products during the past 12 months?  No    Patient roomed by Carlos Hernandez  Shahriar Watters is a 73 year old male seen today alone for counsultation for international travel.   Patient will be departing in  1 month(s) and  traveling with spouse  with organized tour group.    [place]      Patient itinerary :  will be in the urban  region of [place] for [place] [facility]  [place] days then the [facility] to [facility]  for 6 days  which risk for Rabies, food borne illnesses, and motor vehicle accidents. exposure.       Patient's activities will include sightseeing, camping, hiking, and travel by car or other vehicle.     Patient's country of birth is [place]      Special medical concerns: Intermittent asthma but stable for 3-4 years  Pre-travel questionnaire was completed by patient and reviewed by provider.     Vitals: /63   Pulse (!) 48   Temp 98.6  F (37  C) (Temporal)   Resp 16   Ht 1.924 m (6' 3.75\")   Wt 84.1 kg (185 lb 6.4 oz)   SpO2 100%   BMI 22.72 kg/m    BMI= Body mass index is 22.72 kg/m .    EXAM:  General:  Well-nourished, well-developed in no acute distress.  Appears to be stated age, interacts appropriately and expresses " understanding of information given to patient.    Current Outpatient Medications   Medication Sig Dispense Refill    cholecalciferol, vitamin D3, 1,000 unit tablet [CHOLECALCIFEROL, VITAMIN D3, 1,000 UNIT TABLET] Take 1,000 Units by mouth.      cyanocobalamin 1000 MCG tablet [CYANOCOBALAMIN 1000 MCG TABLET] Take 1,000 mcg by mouth. (Patient not taking: Reported on 5/20/2024)      FLOVENT  mcg/actuation inhaler [FLOVENT  MCG/ACTUATION INHALER] TAKE 2 PUFFS BY MOUTH TWICE A DAY (Patient not taking: Reported on 5/20/2024) 12 Inhaler 2     Patient Active Problem List   Diagnosis    Mild intermittent asthma    Benign Adenomatous Polyp Of The Large Intestine    Ophthalmoplegic Migraine Headache    Vitamin B12 Deficiency    TBI (traumatic brain injury) (H)    Lipoma of right thigh     No Known Allergies      Immunizations discussed include:   Covid 19: will get from pharm  Hepatitis A:  Up to date  Hepatitis B: 3rd dose at pharmacy  Influenza: Up to date per report  Typhoid: Ordered/given today, risks, benefits and side effects reviewed  Rabies: Declined  reviewed managment of a animal bite or scratch (washing wound, seek medical care within 24 hours for post exposure prophylaxis )  Yellow Fever: Not indicated  Japanese Encephalitis: Not indicated  Meningococcus: Not indicated  Tetanus/Diphtheria: Up to date  Measles/Mumps/Rubella: Immune by disease history per patient report  Cholera: Not needed  Polio: Not indicated  Pneumococcal: Up to date  Varicella: Immune by disease history per patient report  Shingrix: Is recommend at pharmacy   HPV:  Not indicated     TB: low risk     Altitude Exposure on this trip: yes  Past tolerance to Altitude: did well at 8300    ASSESSMENT/PLAN:  Shahriar was seen today for travel clinic.    Diagnoses and all orders for this visit:    Travel advice encounter  -     acetaZOLAMIDE (DIAMOX) 125 MG tablet; Take one tab every 12 hours starting 24 hours prior to [place] and continue for 24  hours while at the highest altitude  -     azithromycin (ZITHROMAX) 500 MG tablet; Take 1 tablet (500 mg) by mouth daily for 3 doses Take 1 tablet a day for up to 3 days for severe diarrhea    Other orders  -     TYPHOID VACCINE, IM      I have reviewed general recommendations for safe travel   including: food/water precautions, insect precautions,   roadway safety. Educational materials and [employer] report provided.    [place] prophylaxis recommended: none  Symptomatic treatment for traveler's diarrhea: azithromycin  Altitude illness prevention and treatment: Diamox prescription given with instructions on use and education provided on Acute altitude illness recognition and prevention.       Evacuation insurance advised and resources were provided to patient.    Total visit time 30 minutes  with over 50% of time spent counseling patient and shared decision making as detailed above.    Oliva Thakkar CNP  Certificate in Travel Health

## 2024-05-21 ENCOUNTER — PATIENT OUTREACH (OUTPATIENT)
Dept: GASTROENTEROLOGY | Facility: CLINIC | Age: 74
End: 2024-05-21
Payer: COMMERCIAL

## 2024-08-18 ENCOUNTER — HEALTH MAINTENANCE LETTER (OUTPATIENT)
Age: 74
End: 2024-08-18

## 2025-01-08 ENCOUNTER — ANCILLARY PROCEDURE (OUTPATIENT)
Dept: GENERAL RADIOLOGY | Facility: CLINIC | Age: 75
End: 2025-01-08
Attending: STUDENT IN AN ORGANIZED HEALTH CARE EDUCATION/TRAINING PROGRAM
Payer: COMMERCIAL

## 2025-01-08 ENCOUNTER — OFFICE VISIT (OUTPATIENT)
Dept: ORTHOPEDICS | Facility: CLINIC | Age: 75
End: 2025-01-08
Payer: COMMERCIAL

## 2025-01-08 DIAGNOSIS — M25.552 ACUTE HIP PAIN, BILATERAL: ICD-10-CM

## 2025-01-08 DIAGNOSIS — M76.01 GLUTEAL TENDINITIS OF BOTH BUTTOCKS: Primary | ICD-10-CM

## 2025-01-08 DIAGNOSIS — M76.02 GLUTEAL TENDINITIS OF BOTH BUTTOCKS: Primary | ICD-10-CM

## 2025-01-08 DIAGNOSIS — M25.551 ACUTE HIP PAIN, BILATERAL: ICD-10-CM

## 2025-01-08 DIAGNOSIS — M53.3 ACUTE COCCYGEAL PAIN: ICD-10-CM

## 2025-01-08 PROCEDURE — G2211 COMPLEX E/M VISIT ADD ON: HCPCS | Performed by: STUDENT IN AN ORGANIZED HEALTH CARE EDUCATION/TRAINING PROGRAM

## 2025-01-08 PROCEDURE — 99203 OFFICE O/P NEW LOW 30 MIN: CPT | Performed by: STUDENT IN AN ORGANIZED HEALTH CARE EDUCATION/TRAINING PROGRAM

## 2025-01-08 PROCEDURE — 73522 X-RAY EXAM HIPS BI 3-4 VIEWS: CPT | Mod: TC | Performed by: RADIOLOGY

## 2025-01-08 NOTE — LETTER
1/8/2025      Shahriar Watters  [address] [place] [address]      Dear Colleague,    Thank you for referring your patient, Shahriar Watters, to the [facility]. Please see a copy of my visit note below.    ASSESSMENT & PLAN    Shahriar was seen today for pain.    Diagnoses and all orders for this visit:    Gluteal tendinitis of both buttocks  -     XR Hip Bilateral 2 Views Each; Future    Acute coccygeal pain      This issue is acute and Improving. Shahriar presents our clinic today to discuss his bilateral posterior hip pain and soreness.  He reports onset of pain approximately 1 week ago after falling and landing on his tailbone while cross-country skiing.  He was able to finish his ski and continued to ski the next day, and afterwards developed soreness around the tailbone in the gluteal area.  This pain since has bothered him mostly at night and after exercise, and his biggest concern is that he is doing further damage to his hip joints.  He localizes his pain over the tailbone and at the lateral aspect of the hip.  Radiographs taken in clinic today were reviewed with the patient and show mild degenerative changes of the hips bilaterally, but are unremarkable for any acute bony abnormalities.  On exam, he has some mild tenderness to palpation over the left greater trochanter and over the coccyx, but otherwise he has full range of motion at the hip with 5/5 strength in all planes, and negative intra-articular testing.  Overall, his symptoms are most consistent with acute pain at the coccyx and in the glutes muscles and tendons, likely from his acute fall.  We reviewed that there is no evidence for any structural abnormality that he could be worsening.  We discussed treatment options including anti-inflammatory medicines, activity modifications, and physical therapy.  We determine the following plan:  - Patient will modify his activities, backing off on activities  that cause soreness over the next 1 to 2 weeks.  He can then slowly work himself back to full activity  - He can use over-the-counter pain medicines, ice, and heat as needed  - He can let us know via MyChart if he wishes to begin physical therapy  - He can follow-up with me for further evaluation and treatment in 2 to 4 weeks if his symptoms change, worsen, or fail to improve.      Kyree De Santiago [facility] SP[facility] CLINIC [facility]    -----  Chief Complaint   Patient presents with     Lower Back - Pain       SUBJECTIVE  Shahriar Watters is a/an 74 year old male who is seen as a self referral for evaluation of bilateral hip pain.     The patient is seen by themselves.    Onset: 1 week(s) ago. Patient describes injury as falling on his tailbone while cross country skiing  Location of Pain: bilaterally around the lateral hips, still some soreness around the gluteal area where he initially fell  Worsened by: exercise, worse at night  Better with: Aspirin  Treatments tried: ibuprofen  Associated symptoms: no distal numbness or tingling; denies swelling or warmth    Orthopedic/Surgical history: NO  Social History/Occupation: Retired; enjoys cross country skiing, hiking and rowing      REVIEW OF SYSTEMS:  Review of systems negative unless mentioned in HPI     OBJECTIVE:  There were no vitals taken for this visit.   General: healthy, alert and in no distress  Skin: no suspicious lesions or rash.  CV: distal perfusion intact   Resp: normal respiratory effort without conversational dyspnea   Psych: normal mood and affect  Gait: NORMAL  Neuro: Normal light sensory exam of b/l lower extremity     Hip Exam:    Musculoskeletal Exam   Gait Normal     Left Right   Inspection Grossly Normal  Grossly Normal    Palpation     Tenderness over  Greater trochanter  Distal sacrum/coccyx None   Range of Motion     Flexion - Supine  Full to about 90  Full to about 90   ER at 90 of flexion 55 55   IR at 90 of  flexion 40 40   Strength 5/5 Flexion  5/5 Abduction in Neutral  5/5 Abduction in Extension    Grossly Nml otherwise  5/5 Flexion  5/5 Abduction in Neutral  5/5 Abduction in Extension    Grossly Nml otherwise    Pain Provocation Tests     FADIR NEG NEG   QUINN NEG  NEG    Instability/log roll NEG  NEG    Trochanteric Pain Sign NEG NEG    Straight leg raise (passive) NEG  NEG    Posterior/Ischiofemoral Impingement Provocation NEG  NEG    Neurologic Intact sensation          RADIOLOGY:  Final results and radiologist's interpretation, available in the [facility] health record.  Images were reviewed with the patient in the office today.  My personal interpretation of the performed imaging: Mild joint space narrowing of the hips bilaterally.  Small ossified body superior to the greater trochanter on the left without obvious fracture site, appearing chronic in nature.  No acute fractures or bony abnormalities noted.  No obvious fracture of the sacrum noted, although it is partially obscured by bowel gas..             Again, thank you for allowing me to participate in the care of your patient.        Sincerely,        Kyree De Santiago, DO    Electronically signed

## 2025-01-08 NOTE — PROGRESS NOTES
ASSESSMENT & PLAN    Shahriar was seen today for pain.    Diagnoses and all orders for this visit:    Gluteal tendinitis of both buttocks  -     XR Hip Bilateral 2 Views Each; Future    Acute coccygeal pain      This issue is acute and Improving. Shahriar presents our clinic today to discuss his bilateral posterior hip pain and soreness.  He reports onset of pain approximately 1 week ago after falling and landing on his tailbone while cross-country skiing.  He was able to finish his ski and continued to ski the next day, and afterwards developed soreness around the tailbone in the gluteal area.  This pain since has bothered him mostly at night and after exercise, and his biggest concern is that he is doing further damage to his hip joints.  He localizes his pain over the tailbone and at the lateral aspect of the hip.  Radiographs taken in clinic today were reviewed with the patient and show mild degenerative changes of the hips bilaterally, but are unremarkable for any acute bony abnormalities.  On exam, he has some mild tenderness to palpation over the left greater trochanter and over the coccyx, but otherwise he has full range of motion at the hip with 5/5 strength in all planes, and negative intra-articular testing.  Overall, his symptoms are most consistent with acute pain at the coccyx and in the glutes muscles and tendons, likely from his acute fall.  We reviewed that there is no evidence for any structural abnormality that he could be worsening.  We discussed treatment options including anti-inflammatory medicines, activity modifications, and physical therapy.  We determine the following plan:  - Patient will modify his activities, backing off on activities that cause soreness over the next 1 to 2 weeks.  He can then slowly work himself back to full activity  - He can use over-the-counter pain medicines, ice, and heat as needed  - He can let us know via [employer] if he wishes to begin physical therapy  - He can  follow-up with me for further evaluation and treatment in 2 to 4 weeks if his symptoms change, worsen, or fail to improve.      Kyree De Santiago DO  [facility] SPORTS MEDICINE CLIN[facility]    -----  Chief Complaint   Patient presents with    Lower Back - Pain       SUBJECTIVE  Shahriar Watters is a/an 74 year old male who is seen as a self referral for evaluation of bilateral hip pain.     The patient is seen by themselves.    Onset: 1 week(s) ago. Patient describes injury as falling on his tailbone while cross country skiing  Location of Pain: bilaterally around the lateral hips, still some soreness around the gluteal area where he initially fell  Worsened by: exercise, worse at night  Better with: Aspirin  Treatments tried: ibuprofen  Associated symptoms: no distal numbness or tingling; denies swelling or warmth    Orthopedic/Surgical history: NO  Social History/Occupation: Retired; enjoys cross country skiing, hiking and rowing      REVIEW OF SYSTEMS:  Review of systems negative unless mentioned in HPI     OBJECTIVE:  There were no vitals taken for this visit.   General: healthy, alert and in no distress  Skin: no suspicious lesions or rash.  CV: distal perfusion intact   Resp: normal respiratory effort without conversational dyspnea   Psych: normal mood and affect  Gait: NORMAL  Neuro: Normal light sensory exam of b/l lower extremity     Hip Exam:    Musculoskeletal Exam   Gait Normal     Left Right   Inspection Grossly Normal  Grossly Normal    Palpation     Tenderness over  Greater trochanter  Distal sacrum/coccyx None   Range of Motion     Flexion - Supine  Full to about 90  Full to about 90   ER at 90 of flexion 55 55   IR at 90 of flexion 40 40   Strength 5/5 Flexion  5/5 Abduction in Neutral  5/5 Abduction in Extension    Grossly Nml otherwise  5/5 Flexion  5/5 Abduction in Neutral  5/5 Abduction in Extension    Grossly Nml otherwise    Pain Provocation Tests     FADIR NEG NEG   QUINN NEG   NEG    Instability/log roll NEG  NEG    Trochanteric Pain Sign NEG NEG    Straight leg raise (passive) NEG  NEG    Posterior/Ischiofemoral Impingement Provocation NEG  NEG    Neurologic Intact sensation          RADIOLOGY:  Final results and radiologist's interpretation, available in the [facility] health record.  Images were reviewed with the patient in the office today.  My personal interpretation of the performed imaging: Mild joint space narrowing of the hips bilaterally.  Small ossified body superior to the greater trochanter on the left without obvious fracture site, appearing chronic in nature.  No acute fractures or bony abnormalities noted.  No obvious fracture of the sacrum noted, although it is partially obscured by bowel gas..

## 2025-04-28 ENCOUNTER — LAB REQUISITION (OUTPATIENT)
Dept: LAB | Facility: CLINIC | Age: 75
End: 2025-04-28

## 2025-04-28 DIAGNOSIS — Z13.220 ENCOUNTER FOR SCREENING FOR LIPOID DISORDERS: ICD-10-CM

## 2025-04-28 DIAGNOSIS — Z12.5 ENCOUNTER FOR SCREENING FOR MALIGNANT NEOPLASM OF PROSTATE: ICD-10-CM

## 2025-04-28 DIAGNOSIS — Z13.1 ENCOUNTER FOR SCREENING FOR DIABETES MELLITUS: ICD-10-CM

## 2025-04-28 LAB
ALBUMIN SERPL BCG-MCNC: 4.4 G/DL (ref 3.5–5.2)
ALP SERPL-CCNC: 42 U/L (ref 40–150)
ALT SERPL W P-5'-P-CCNC: 24 U/L (ref 0–70)
ANION GAP SERPL CALCULATED.3IONS-SCNC: 7 MMOL/L (ref 7–15)
AST SERPL W P-5'-P-CCNC: 24 U/L (ref 0–45)
BILIRUB SERPL-MCNC: 0.4 MG/DL
BUN SERPL-MCNC: 22.4 MG/DL (ref 8–23)
CALCIUM SERPL-MCNC: 9.3 MG/DL (ref 8.8–10.4)
CHLORIDE SERPL-SCNC: 105 MMOL/L (ref 98–107)
CHOLEST SERPL-MCNC: 190 MG/DL
CREAT SERPL-MCNC: 0.95 MG/DL (ref 0.67–1.17)
EGFRCR SERPLBLD CKD-EPI 2021: 84 ML/MIN/1.73M2
FASTING STATUS PATIENT QL REPORTED: ABNORMAL
FASTING STATUS PATIENT QL REPORTED: NORMAL
GLUCOSE SERPL-MCNC: 97 MG/DL (ref 70–99)
HCO3 SERPL-SCNC: 29 MMOL/L (ref 22–29)
HDLC SERPL-MCNC: 58 MG/DL
LDLC SERPL CALC-MCNC: 106 MG/DL
NONHDLC SERPL-MCNC: 132 MG/DL
POTASSIUM SERPL-SCNC: 4.5 MMOL/L (ref 3.4–5.3)
PROT SERPL-MCNC: 6.4 G/DL (ref 6.4–8.3)
PSA SERPL DL<=0.01 NG/ML-MCNC: 0.83 NG/ML (ref 0–6.5)
SODIUM SERPL-SCNC: 141 MMOL/L (ref 135–145)
TRIGL SERPL-MCNC: 128 MG/DL

## 2025-04-28 PROCEDURE — G0103 PSA SCREENING: HCPCS | Performed by: FAMILY MEDICINE

## 2025-04-28 PROCEDURE — 82172 ASSAY OF APOLIPOPROTEIN: CPT | Performed by: FAMILY MEDICINE

## 2025-04-28 PROCEDURE — 80053 COMPREHEN METABOLIC PANEL: CPT | Performed by: FAMILY MEDICINE

## 2025-04-28 PROCEDURE — 80061 LIPID PANEL: CPT | Performed by: FAMILY MEDICINE

## 2025-04-29 LAB — APO B100 SERPL-MCNC: 92 MG/DL
